# Patient Record
Sex: MALE | Race: WHITE | Employment: UNEMPLOYED | URBAN - METROPOLITAN AREA
[De-identification: names, ages, dates, MRNs, and addresses within clinical notes are randomized per-mention and may not be internally consistent; named-entity substitution may affect disease eponyms.]

---

## 2019-01-01 ENCOUNTER — MED REC SCAN ONLY (OUTPATIENT)
Dept: FAMILY MEDICINE CLINIC | Facility: CLINIC | Age: 54
End: 2019-01-01

## 2019-01-01 ENCOUNTER — HOSPITAL ENCOUNTER (INPATIENT)
Facility: HOSPITAL | Age: 54
LOS: 9 days | Discharge: HOSPICE | DRG: 682 | End: 2019-01-01
Attending: EMERGENCY MEDICINE | Admitting: HOSPITALIST
Payer: COMMERCIAL

## 2019-01-01 ENCOUNTER — APPOINTMENT (OUTPATIENT)
Dept: GENERAL RADIOLOGY | Facility: HOSPITAL | Age: 54
DRG: 871 | End: 2019-01-01
Attending: INTERNAL MEDICINE
Payer: COMMERCIAL

## 2019-01-01 ENCOUNTER — APPOINTMENT (OUTPATIENT)
Dept: GENERAL RADIOLOGY | Facility: HOSPITAL | Age: 54
DRG: 374 | End: 2019-01-01
Attending: INTERNAL MEDICINE
Payer: COMMERCIAL

## 2019-01-01 ENCOUNTER — APPOINTMENT (OUTPATIENT)
Dept: CT IMAGING | Age: 54
DRG: 871 | End: 2019-01-01
Attending: EMERGENCY MEDICINE
Payer: COMMERCIAL

## 2019-01-01 ENCOUNTER — SNF VISIT (OUTPATIENT)
Dept: INTERNAL MEDICINE CLINIC | Age: 54
End: 2019-01-01

## 2019-01-01 ENCOUNTER — NURSE ONLY (OUTPATIENT)
Dept: LAB | Age: 54
End: 2019-01-01
Attending: FAMILY MEDICINE
Payer: COMMERCIAL

## 2019-01-01 ENCOUNTER — APPOINTMENT (OUTPATIENT)
Dept: CT IMAGING | Facility: HOSPITAL | Age: 54
End: 2019-01-01
Attending: EMERGENCY MEDICINE
Payer: COMMERCIAL

## 2019-01-01 ENCOUNTER — HOSPITAL ENCOUNTER (INPATIENT)
Facility: HOSPITAL | Age: 54
LOS: 2 days | DRG: 682 | End: 2019-01-01
Attending: HOSPITALIST | Admitting: HOSPITALIST
Payer: OTHER MISCELLANEOUS

## 2019-01-01 ENCOUNTER — EXTERNAL FACILITY (OUTPATIENT)
Dept: FAMILY MEDICINE CLINIC | Facility: CLINIC | Age: 54
End: 2019-01-01

## 2019-01-01 ENCOUNTER — HOSPITAL ENCOUNTER (EMERGENCY)
Facility: HOSPITAL | Age: 54
Discharge: HOME | End: 2019-01-01
Attending: EMERGENCY MEDICINE
Payer: COMMERCIAL

## 2019-01-01 ENCOUNTER — APPOINTMENT (OUTPATIENT)
Dept: GENERAL RADIOLOGY | Facility: HOSPITAL | Age: 54
DRG: 682 | End: 2019-01-01
Attending: HOSPITALIST
Payer: COMMERCIAL

## 2019-01-01 ENCOUNTER — HOSPITAL ENCOUNTER (INPATIENT)
Facility: HOSPITAL | Age: 54
LOS: 6 days | Discharge: HOME HEALTH | DRG: 871 | End: 2019-01-01
Attending: EMERGENCY MEDICINE | Admitting: INTERNAL MEDICINE
Payer: COMMERCIAL

## 2019-01-01 ENCOUNTER — APPOINTMENT (OUTPATIENT)
Dept: ULTRASOUND IMAGING | Facility: HOSPITAL | Age: 54
End: 2019-01-01
Attending: EMERGENCY MEDICINE
Payer: COMMERCIAL

## 2019-01-01 ENCOUNTER — APPOINTMENT (OUTPATIENT)
Dept: GENERAL RADIOLOGY | Facility: HOSPITAL | Age: 54
End: 2019-01-01
Attending: EMERGENCY MEDICINE
Payer: COMMERCIAL

## 2019-01-01 ENCOUNTER — APPOINTMENT (OUTPATIENT)
Dept: GENERAL RADIOLOGY | Facility: HOSPITAL | Age: 54
DRG: 682 | End: 2019-01-01
Attending: INTERNAL MEDICINE
Payer: COMMERCIAL

## 2019-01-01 ENCOUNTER — APPOINTMENT (OUTPATIENT)
Dept: CT IMAGING | Facility: HOSPITAL | Age: 54
DRG: 871 | End: 2019-01-01
Attending: INTERNAL MEDICINE
Payer: COMMERCIAL

## 2019-01-01 ENCOUNTER — APPOINTMENT (OUTPATIENT)
Dept: GENERAL RADIOLOGY | Facility: HOSPITAL | Age: 54
DRG: 374 | End: 2019-01-01
Attending: EMERGENCY MEDICINE
Payer: COMMERCIAL

## 2019-01-01 ENCOUNTER — APPOINTMENT (OUTPATIENT)
Dept: MRI IMAGING | Facility: HOSPITAL | Age: 54
DRG: 871 | End: 2019-01-01
Attending: INTERNAL MEDICINE
Payer: COMMERCIAL

## 2019-01-01 ENCOUNTER — APPOINTMENT (OUTPATIENT)
Dept: GENERAL RADIOLOGY | Facility: HOSPITAL | Age: 54
DRG: 374 | End: 2019-01-01
Attending: HOSPITALIST
Payer: COMMERCIAL

## 2019-01-01 ENCOUNTER — INITIAL APN SNF VISIT (OUTPATIENT)
Dept: INTERNAL MEDICINE CLINIC | Age: 54
End: 2019-01-01

## 2019-01-01 ENCOUNTER — HOSPITAL ENCOUNTER (INPATIENT)
Facility: HOSPITAL | Age: 54
LOS: 15 days | Discharge: NURSING, REHAB OR LONG-TERM CARE | DRG: 374 | End: 2019-01-01
Attending: EMERGENCY MEDICINE | Admitting: INTERNAL MEDICINE
Payer: COMMERCIAL

## 2019-01-01 ENCOUNTER — APPOINTMENT (OUTPATIENT)
Dept: MRI IMAGING | Facility: HOSPITAL | Age: 54
DRG: 374 | End: 2019-01-01
Attending: INTERNAL MEDICINE
Payer: COMMERCIAL

## 2019-01-01 ENCOUNTER — APPOINTMENT (OUTPATIENT)
Dept: ULTRASOUND IMAGING | Facility: HOSPITAL | Age: 54
DRG: 374 | End: 2019-01-01
Attending: EMERGENCY MEDICINE
Payer: COMMERCIAL

## 2019-01-01 ENCOUNTER — APPOINTMENT (OUTPATIENT)
Dept: CV DIAGNOSTICS | Facility: HOSPITAL | Age: 54
DRG: 374 | End: 2019-01-01
Attending: INTERNAL MEDICINE
Payer: COMMERCIAL

## 2019-01-01 VITALS
OXYGEN SATURATION: 87 % | HEART RATE: 98 BPM | RESPIRATION RATE: 20 BRPM | SYSTOLIC BLOOD PRESSURE: 73 MMHG | TEMPERATURE: 99 F | DIASTOLIC BLOOD PRESSURE: 39 MMHG

## 2019-01-01 VITALS
RESPIRATION RATE: 20 BRPM | OXYGEN SATURATION: 94 % | HEART RATE: 105 BPM | SYSTOLIC BLOOD PRESSURE: 120 MMHG | DIASTOLIC BLOOD PRESSURE: 73 MMHG | TEMPERATURE: 98 F

## 2019-01-01 VITALS
HEIGHT: 72 IN | TEMPERATURE: 98 F | OXYGEN SATURATION: 92 % | WEIGHT: 206.19 LBS | SYSTOLIC BLOOD PRESSURE: 138 MMHG | DIASTOLIC BLOOD PRESSURE: 82 MMHG | BODY MASS INDEX: 27.93 KG/M2 | HEART RATE: 114 BPM | RESPIRATION RATE: 18 BRPM

## 2019-01-01 VITALS
WEIGHT: 172.19 LBS | SYSTOLIC BLOOD PRESSURE: 98 MMHG | DIASTOLIC BLOOD PRESSURE: 52 MMHG | RESPIRATION RATE: 17 BRPM | BODY MASS INDEX: 22.82 KG/M2 | OXYGEN SATURATION: 96 % | HEART RATE: 110 BPM | TEMPERATURE: 98 F | HEIGHT: 73 IN

## 2019-01-01 VITALS
OXYGEN SATURATION: 93 % | SYSTOLIC BLOOD PRESSURE: 137 MMHG | RESPIRATION RATE: 18 BRPM | WEIGHT: 219 LBS | HEIGHT: 73 IN | TEMPERATURE: 99 F | HEART RATE: 104 BPM | BODY MASS INDEX: 29.03 KG/M2 | DIASTOLIC BLOOD PRESSURE: 83 MMHG

## 2019-01-01 VITALS
WEIGHT: 206.13 LBS | RESPIRATION RATE: 22 BRPM | BODY MASS INDEX: 28 KG/M2 | SYSTOLIC BLOOD PRESSURE: 113 MMHG | HEART RATE: 106 BPM | TEMPERATURE: 98 F | OXYGEN SATURATION: 97 % | DIASTOLIC BLOOD PRESSURE: 63 MMHG

## 2019-01-01 VITALS
SYSTOLIC BLOOD PRESSURE: 112 MMHG | DIASTOLIC BLOOD PRESSURE: 81 MMHG | OXYGEN SATURATION: 90 % | RESPIRATION RATE: 18 BRPM | TEMPERATURE: 98 F | HEART RATE: 115 BPM

## 2019-01-01 VITALS
HEIGHT: 73 IN | SYSTOLIC BLOOD PRESSURE: 118 MMHG | WEIGHT: 213 LBS | TEMPERATURE: 99 F | BODY MASS INDEX: 28.23 KG/M2 | OXYGEN SATURATION: 93 % | RESPIRATION RATE: 18 BRPM | DIASTOLIC BLOOD PRESSURE: 79 MMHG | HEART RATE: 108 BPM

## 2019-01-01 VITALS
DIASTOLIC BLOOD PRESSURE: 71 MMHG | OXYGEN SATURATION: 90 % | SYSTOLIC BLOOD PRESSURE: 109 MMHG | RESPIRATION RATE: 22 BRPM | TEMPERATURE: 101 F | HEART RATE: 115 BPM

## 2019-01-01 DIAGNOSIS — D64.9 ANEMIA, UNSPECIFIED TYPE: ICD-10-CM

## 2019-01-01 DIAGNOSIS — C78.7 LIVER METASTASIS (HCC): ICD-10-CM

## 2019-01-01 DIAGNOSIS — E80.6 HYPERBILIRUBINEMIA: ICD-10-CM

## 2019-01-01 DIAGNOSIS — R09.02 HYPOXEMIA: ICD-10-CM

## 2019-01-01 DIAGNOSIS — R53.1 WEAKNESS: ICD-10-CM

## 2019-01-01 DIAGNOSIS — R53.81 PHYSICAL DECONDITIONING: ICD-10-CM
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
## 2019-01-01 DIAGNOSIS — R53.1 GENERALIZED WEAKNESS: ICD-10-CM

## 2019-01-01 DIAGNOSIS — E86.0 DEHYDRATION: ICD-10-CM

## 2019-01-01 DIAGNOSIS — A41.9 SEPSIS DUE TO PNEUMONIA (HCC): ICD-10-CM

## 2019-01-01 DIAGNOSIS — C16.9 STOMACH CANCER (HCC): Primary | ICD-10-CM

## 2019-01-01 DIAGNOSIS — D72.829 LEUKOCYTOSIS, UNSPECIFIED TYPE: ICD-10-CM

## 2019-01-01 DIAGNOSIS — J18.9 SEPSIS DUE TO PNEUMONIA (HCC): Primary | ICD-10-CM

## 2019-01-01 DIAGNOSIS — C16.0 MALIGNANT NEOPLASM OF CARDIA OF STOMACH (HCC): ICD-10-CM

## 2019-01-01 DIAGNOSIS — Z71.89 GOALS OF CARE, COUNSELING/DISCUSSION: ICD-10-CM

## 2019-01-01 DIAGNOSIS — C16.9 MALIGNANT NEOPLASM OF STOMACH, UNSPECIFIED LOCATION (HCC): Primary | ICD-10-CM

## 2019-01-01 DIAGNOSIS — E72.20 SERUM AMMONIA INCREASED (HCC): ICD-10-CM

## 2019-01-01 DIAGNOSIS — F41.1 GAD (GENERALIZED ANXIETY DISORDER): ICD-10-CM

## 2019-01-01 DIAGNOSIS — R09.02 HYPOXIA: ICD-10-CM

## 2019-01-01 DIAGNOSIS — J18.9 SEPSIS DUE TO PNEUMONIA (HCC): ICD-10-CM

## 2019-01-01 DIAGNOSIS — R10.13 ABDOMINAL PAIN, EPIGASTRIC: Primary | ICD-10-CM

## 2019-01-01 DIAGNOSIS — J18.9 COMMUNITY ACQUIRED PNEUMONIA OF RIGHT LUNG, UNSPECIFIED PART OF LUNG: ICD-10-CM

## 2019-01-01 DIAGNOSIS — Z51.5 PALLIATIVE CARE ENCOUNTER: ICD-10-CM

## 2019-01-01 DIAGNOSIS — R16.0 LIVER MASSES: ICD-10-CM

## 2019-01-01 DIAGNOSIS — D72.828 OTHER ELEVATED WHITE BLOOD CELL (WBC) COUNT: ICD-10-CM

## 2019-01-01 DIAGNOSIS — R00.0 SINUS TACHYCARDIA: ICD-10-CM

## 2019-01-01 DIAGNOSIS — E83.52 HYPERCALCEMIA OF MALIGNANCY: Primary | ICD-10-CM

## 2019-01-01 DIAGNOSIS — E87.0 HYPERNATREMIA: Primary | ICD-10-CM

## 2019-01-01 DIAGNOSIS — G93.40 ENCEPHALOPATHY: ICD-10-CM

## 2019-01-01 DIAGNOSIS — C16.9 MALIGNANT NEOPLASM OF STOMACH, UNSPECIFIED LOCATION (HCC): ICD-10-CM

## 2019-01-01 DIAGNOSIS — E83.52 HYPERCALCEMIA: ICD-10-CM

## 2019-01-01 DIAGNOSIS — R41.82 ALTERED MENTAL STATUS, UNSPECIFIED ALTERED MENTAL STATUS TYPE: ICD-10-CM

## 2019-01-01 DIAGNOSIS — E87.1 HYPONATREMIA: ICD-10-CM

## 2019-01-01 DIAGNOSIS — C16.2 MALIGNANT NEOPLASM OF BODY OF STOMACH (HCC): Primary | ICD-10-CM

## 2019-01-01 DIAGNOSIS — R50.81 FEVER IN OTHER DISEASES: ICD-10-CM

## 2019-01-01 DIAGNOSIS — A41.9 SEPSIS DUE TO PNEUMONIA (HCC): Primary | ICD-10-CM

## 2019-01-01 DIAGNOSIS — R73.9 HYPERGLYCEMIA: ICD-10-CM

## 2019-01-01 DIAGNOSIS — E83.52 HYPERCALCEMIA OF MALIGNANCY: ICD-10-CM

## 2019-01-01 DIAGNOSIS — K31.89 GASTRIC MASS: ICD-10-CM

## 2019-01-01 DIAGNOSIS — Z86.718 HISTORY OF BLOOD CLOTS: ICD-10-CM

## 2019-01-01 DIAGNOSIS — J18.9 COMMUNITY ACQUIRED PNEUMONIA OF RIGHT LUNG, UNSPECIFIED PART OF LUNG: Primary | ICD-10-CM

## 2019-01-01 LAB
ADENOVIRUS PCR:: NEGATIVE
ALBUMIN SERPL-MCNC: 1.9 G/DL (ref 3.4–5)
ALBUMIN SERPL-MCNC: 2.1 G/DL (ref 3.4–5)
ALBUMIN SERPL-MCNC: 2.3 G/DL (ref 3.4–5)
ALBUMIN SERPL-MCNC: 3.1 G/DL (ref 3.1–4.5)
ALBUMIN/GLOB SERPL: 0.5 {RATIO} (ref 1–2)
ALBUMIN/GLOB SERPL: 0.6 {RATIO} (ref 1–2)
ALP LIVER SERPL-CCNC: 298 U/L (ref 45–117)
ALP LIVER SERPL-CCNC: 311 U/L (ref 45–117)
ALP LIVER SERPL-CCNC: 360 U/L (ref 45–117)
ALP LIVER SERPL-CCNC: 379 U/L (ref 45–117)
ALT SERPL-CCNC: 30 U/L (ref 16–61)
ALT SERPL-CCNC: 31 U/L (ref 16–61)
ALT SERPL-CCNC: 41 U/L (ref 16–61)
ALT SERPL-CCNC: 75 U/L (ref 17–63)
ANION GAP SERPL CALC-SCNC: 10 MMOL/L (ref 0–18)
ANION GAP SERPL CALC-SCNC: 7 MMOL/L (ref 0–18)
ANION GAP SERPL CALC-SCNC: 8 MMOL/L (ref 0–18)
ANION GAP SERPL CALC-SCNC: 9 MMOL/L (ref 0–18)
ANTIBODY SCREEN: NEGATIVE
APTT PPP: 52.3 SECONDS (ref 26.1–34.6)
AST SERPL-CCNC: 42 U/L (ref 15–37)
AST SERPL-CCNC: 46 U/L (ref 15–37)
AST SERPL-CCNC: 51 U/L (ref 15–37)
AST SERPL-CCNC: 56 U/L (ref 15–41)
ATRIAL RATE: 101 BPM
B PERT DNA SPEC QL NAA+PROBE: NEGATIVE
BASOPHILS # BLD AUTO: 0.03 X10(3) UL (ref 0–0.2)
BASOPHILS # BLD AUTO: 0.05 X10(3) UL (ref 0–0.2)
BASOPHILS # BLD AUTO: 0.05 X10(3) UL (ref 0–0.2)
BASOPHILS # BLD AUTO: 0.06 X10(3) UL (ref 0–0.2)
BASOPHILS # BLD AUTO: 0.07 X10(3) UL (ref 0–0.2)
BASOPHILS # BLD AUTO: 0.08 X10(3) UL (ref 0–0.2)
BASOPHILS # BLD AUTO: 0.08 X10(3) UL (ref 0–0.2)
BASOPHILS NFR BLD AUTO: 0.2 %
BASOPHILS NFR BLD AUTO: 0.3 %
BASOPHILS NFR BLD AUTO: 0.5 %
BILIRUB SERPL-MCNC: 0.7 MG/DL (ref 0.1–2)
BILIRUB SERPL-MCNC: 2 MG/DL (ref 0.1–2)
BILIRUB UR QL STRIP.AUTO: NEGATIVE
BLOOD TYPE BARCODE: 5100
BUN BLD-MCNC: 12 MG/DL (ref 7–18)
BUN BLD-MCNC: 13 MG/DL (ref 8–20)
BUN BLD-MCNC: 15 MG/DL (ref 7–18)
BUN BLD-MCNC: 7 MG/DL (ref 7–18)
BUN/CREAT SERPL: 10.8 (ref 10–20)
BUN/CREAT SERPL: 13.7 (ref 10–20)
BUN/CREAT SERPL: 15 (ref 10–20)
BUN/CREAT SERPL: 16.9 (ref 10–20)
C PNEUM DNA SPEC QL NAA+PROBE: NEGATIVE
CALCIUM BLD-MCNC: 10.2 MG/DL (ref 8.5–10.1)
CALCIUM BLD-MCNC: 10.6 MG/DL (ref 8.5–10.1)
CALCIUM BLD-MCNC: 9.4 MG/DL (ref 8.5–10.1)
CALCIUM BLD-MCNC: 9.9 MG/DL (ref 8.3–10.3)
CHLORIDE SERPL-SCNC: 100 MMOL/L (ref 101–111)
CHLORIDE SERPL-SCNC: 102 MMOL/L (ref 98–107)
CHLORIDE SERPL-SCNC: 102 MMOL/L (ref 98–107)
CHLORIDE SERPL-SCNC: 99 MMOL/L (ref 98–107)
CLARITY UR REFRACT.AUTO: CLEAR
CLARITY UR REFRACT.AUTO: CLEAR
CO2 SERPL-SCNC: 24 MMOL/L (ref 21–32)
CO2 SERPL-SCNC: 26 MMOL/L (ref 21–32)
CO2 SERPL-SCNC: 26 MMOL/L (ref 21–32)
CO2 SERPL-SCNC: 29 MMOL/L (ref 22–32)
COLOR UR AUTO: YELLOW
COLOR UR AUTO: YELLOW
CORONAVIRUS 229E PCR:: NEGATIVE
CORONAVIRUS HKU1 PCR:: NEGATIVE
CORONAVIRUS NL63 PCR:: NEGATIVE
CORONAVIRUS OC43 PCR:: NEGATIVE
CREAT BLD-MCNC: 0.65 MG/DL (ref 0.7–1.3)
CREAT BLD-MCNC: 0.8 MG/DL (ref 0.7–1.3)
CREAT BLD-MCNC: 0.89 MG/DL (ref 0.7–1.3)
CREAT BLD-MCNC: 0.95 MG/DL (ref 0.7–1.3)
DEPRECATED RDW RBC AUTO: 43 FL (ref 35.1–46.3)
DEPRECATED RDW RBC AUTO: 44.3 FL (ref 35.1–46.3)
DEPRECATED RDW RBC AUTO: 45.2 FL (ref 35.1–46.3)
DEPRECATED RDW RBC AUTO: 45.4 FL (ref 35.1–46.3)
DEPRECATED RDW RBC AUTO: 45.5 FL (ref 35.1–46.3)
DEPRECATED RDW RBC AUTO: 46.5 FL (ref 35.1–46.3)
DEPRECATED RDW RBC AUTO: 46.7 FL (ref 35.1–46.3)
EOSINOPHIL # BLD AUTO: 0.01 X10(3) UL (ref 0–0.7)
EOSINOPHIL # BLD AUTO: 0.02 X10(3) UL (ref 0–0.7)
EOSINOPHIL # BLD AUTO: 0.03 X10(3) UL (ref 0–0.7)
EOSINOPHIL # BLD AUTO: 0.04 X10(3) UL (ref 0–0.7)
EOSINOPHIL # BLD AUTO: 0.05 X10(3) UL (ref 0–0.7)
EOSINOPHIL # BLD AUTO: 0.1 X10(3) UL (ref 0–0.7)
EOSINOPHIL # BLD AUTO: 0.21 X10(3) UL (ref 0–0.7)
EOSINOPHIL NFR BLD AUTO: 0 %
EOSINOPHIL NFR BLD AUTO: 0.1 %
EOSINOPHIL NFR BLD AUTO: 0.2 %
EOSINOPHIL NFR BLD AUTO: 0.2 %
EOSINOPHIL NFR BLD AUTO: 0.3 %
EOSINOPHIL NFR BLD AUTO: 0.5 %
EOSINOPHIL NFR BLD AUTO: 1.4 %
ERYTHROCYTE [DISTWIDTH] IN BLOOD BY AUTOMATED COUNT: 13.6 % (ref 11–15)
ERYTHROCYTE [DISTWIDTH] IN BLOOD BY AUTOMATED COUNT: 15.8 % (ref 11–15)
ERYTHROCYTE [DISTWIDTH] IN BLOOD BY AUTOMATED COUNT: 15.9 % (ref 11–15)
ERYTHROCYTE [DISTWIDTH] IN BLOOD BY AUTOMATED COUNT: 16 % (ref 11–15)
ERYTHROCYTE [DISTWIDTH] IN BLOOD BY AUTOMATED COUNT: 16.4 % (ref 11–15)
FLUAV RNA SPEC QL NAA+PROBE: NEGATIVE
FLUBV RNA SPEC QL NAA+PROBE: NEGATIVE
GLOBULIN PLAS-MCNC: 4 G/DL (ref 2.8–4.4)
GLOBULIN PLAS-MCNC: 4.4 G/DL (ref 2.8–4.4)
GLOBULIN PLAS-MCNC: 4.6 G/DL (ref 2.8–4.4)
GLOBULIN PLAS-MCNC: 5.1 G/DL (ref 2.8–4.4)
GLUCOSE BLD-MCNC: 103 MG/DL (ref 70–99)
GLUCOSE BLD-MCNC: 110 MG/DL (ref 70–99)
GLUCOSE BLD-MCNC: 115 MG/DL (ref 70–99)
GLUCOSE BLD-MCNC: 95 MG/DL (ref 70–99)
GLUCOSE UR STRIP.AUTO-MCNC: NEGATIVE MG/DL
GLUCOSE UR STRIP.AUTO-MCNC: NEGATIVE MG/DL
HCT VFR BLD AUTO: 24.1 % (ref 39–53)
HCT VFR BLD AUTO: 24.9 % (ref 39–53)
HCT VFR BLD AUTO: 26 % (ref 39–53)
HCT VFR BLD AUTO: 26.3 % (ref 39–53)
HCT VFR BLD AUTO: 26.4 % (ref 39–53)
HCT VFR BLD AUTO: 29.6 % (ref 39–53)
HCT VFR BLD AUTO: 40.7 % (ref 39–53)
HGB BLD-MCNC: 13 G/DL (ref 13–17.5)
HGB BLD-MCNC: 7.3 G/DL (ref 13–17.5)
HGB BLD-MCNC: 7.5 G/DL (ref 13–17.5)
HGB BLD-MCNC: 8.1 G/DL (ref 13–17.5)
HGB BLD-MCNC: 8.6 G/DL (ref 13–17.5)
IMM GRANULOCYTES # BLD AUTO: 0.06 X10(3) UL (ref 0–1)
IMM GRANULOCYTES # BLD AUTO: 0.19 X10(3) UL (ref 0–1)
IMM GRANULOCYTES # BLD AUTO: 0.2 X10(3) UL (ref 0–1)
IMM GRANULOCYTES # BLD AUTO: 0.21 X10(3) UL (ref 0–1)
IMM GRANULOCYTES # BLD AUTO: 0.23 X10(3) UL (ref 0–1)
IMM GRANULOCYTES # BLD AUTO: 0.24 X10(3) UL (ref 0–1)
IMM GRANULOCYTES # BLD AUTO: 0.29 X10(3) UL (ref 0–1)
IMM GRANULOCYTES NFR BLD: 0.4 %
IMM GRANULOCYTES NFR BLD: 0.9 %
IMM GRANULOCYTES NFR BLD: 0.9 %
IMM GRANULOCYTES NFR BLD: 1.1 %
IMM GRANULOCYTES NFR BLD: 1.2 %
IMM GRANULOCYTES NFR BLD: 1.2 %
IMM GRANULOCYTES NFR BLD: 1.5 %
INR BLD: 2.66 (ref 0.9–1.1)
KETONES UR STRIP.AUTO-MCNC: NEGATIVE MG/DL
KETONES UR STRIP.AUTO-MCNC: NEGATIVE MG/DL
LACTATE SERPL-SCNC: 1.7 MMOL/L (ref 0.4–2)
LEUKOCYTE ESTERASE UR QL STRIP.AUTO: NEGATIVE
LEUKOCYTE ESTERASE UR QL STRIP.AUTO: NEGATIVE
LIPASE: 146 U/L (ref 73–393)
LYMPHOCYTES # BLD AUTO: 1.18 X10(3) UL (ref 1–4)
LYMPHOCYTES # BLD AUTO: 1.19 X10(3) UL (ref 1–4)
LYMPHOCYTES # BLD AUTO: 1.44 X10(3) UL (ref 1–4)
LYMPHOCYTES # BLD AUTO: 1.57 X10(3) UL (ref 1–4)
LYMPHOCYTES # BLD AUTO: 1.63 X10(3) UL (ref 1–4)
LYMPHOCYTES # BLD AUTO: 1.63 X10(3) UL (ref 1–4)
LYMPHOCYTES # BLD AUTO: 1.93 X10(3) UL (ref 1–4)
LYMPHOCYTES NFR BLD AUTO: 13.3 %
LYMPHOCYTES NFR BLD AUTO: 5.1 %
LYMPHOCYTES NFR BLD AUTO: 6 %
LYMPHOCYTES NFR BLD AUTO: 7.1 %
LYMPHOCYTES NFR BLD AUTO: 7.7 %
LYMPHOCYTES NFR BLD AUTO: 8.2 %
LYMPHOCYTES NFR BLD AUTO: 8.6 %
M PROTEIN MFR SERPL ELPH: 5.9 G/DL (ref 6.4–8.2)
M PROTEIN MFR SERPL ELPH: 6.5 G/DL (ref 6.4–8.2)
M PROTEIN MFR SERPL ELPH: 6.9 G/DL (ref 6.4–8.2)
M PROTEIN MFR SERPL ELPH: 8.2 G/DL (ref 6.4–8.2)
MCH RBC QN AUTO: 23.1 PG (ref 26–34)
MCH RBC QN AUTO: 23.5 PG (ref 26–34)
MCH RBC QN AUTO: 23.9 PG (ref 26–34)
MCH RBC QN AUTO: 24.3 PG (ref 26–34)
MCH RBC QN AUTO: 24.5 PG (ref 26–34)
MCH RBC QN AUTO: 24.8 PG (ref 26–34)
MCH RBC QN AUTO: 27.9 PG (ref 26–34)
MCHC RBC AUTO-ENTMCNC: 29.1 G/DL (ref 31–37)
MCHC RBC AUTO-ENTMCNC: 30.1 G/DL (ref 31–37)
MCHC RBC AUTO-ENTMCNC: 30.3 G/DL (ref 31–37)
MCHC RBC AUTO-ENTMCNC: 30.7 G/DL (ref 31–37)
MCHC RBC AUTO-ENTMCNC: 30.8 G/DL (ref 31–37)
MCHC RBC AUTO-ENTMCNC: 31.2 G/DL (ref 31–37)
MCHC RBC AUTO-ENTMCNC: 31.9 G/DL (ref 31–37)
MCV RBC AUTO: 77.7 FL (ref 80–100)
MCV RBC AUTO: 79 FL (ref 80–100)
MCV RBC AUTO: 79.3 FL (ref 80–100)
MCV RBC AUTO: 79.6 FL (ref 80–100)
MCV RBC AUTO: 79.7 FL (ref 80–100)
MCV RBC AUTO: 79.8 FL (ref 80–100)
MCV RBC AUTO: 87.3 FL (ref 80–100)
METAPNEUMOVIRUS PCR:: NEGATIVE
MONOCYTES # BLD AUTO: 1.53 X10(3) UL (ref 0.1–1)
MONOCYTES # BLD AUTO: 1.62 X10(3) UL (ref 0.1–1)
MONOCYTES # BLD AUTO: 1.71 X10(3) UL (ref 0.1–1)
MONOCYTES # BLD AUTO: 1.73 X10(3) UL (ref 0.1–1)
MONOCYTES # BLD AUTO: 1.74 X10(3) UL (ref 0.1–1)
MONOCYTES # BLD AUTO: 1.92 X10(3) UL (ref 0.1–1)
MONOCYTES # BLD AUTO: 2.06 X10(3) UL (ref 0.1–1)
MONOCYTES NFR BLD AUTO: 10.5 %
MONOCYTES NFR BLD AUTO: 8.6 %
MONOCYTES NFR BLD AUTO: 8.6 %
MONOCYTES NFR BLD AUTO: 8.7 %
MONOCYTES NFR BLD AUTO: 8.8 %
MONOCYTES NFR BLD AUTO: 8.9 %
MONOCYTES NFR BLD AUTO: 9.1 %
MYCOPLASMA PNEUMONIA PCR:: NEGATIVE
NEUTROPHILS # BLD AUTO: 10.75 X10 (3) UL (ref 1.5–7.7)
NEUTROPHILS # BLD AUTO: 10.75 X10(3) UL (ref 1.5–7.7)
NEUTROPHILS # BLD AUTO: 15.29 X10 (3) UL (ref 1.5–7.7)
NEUTROPHILS # BLD AUTO: 15.29 X10(3) UL (ref 1.5–7.7)
NEUTROPHILS # BLD AUTO: 15.37 X10 (3) UL (ref 1.5–7.7)
NEUTROPHILS # BLD AUTO: 15.37 X10(3) UL (ref 1.5–7.7)
NEUTROPHILS # BLD AUTO: 16.26 X10 (3) UL (ref 1.5–7.7)
NEUTROPHILS # BLD AUTO: 16.26 X10(3) UL (ref 1.5–7.7)
NEUTROPHILS # BLD AUTO: 16.32 X10 (3) UL (ref 1.5–7.7)
NEUTROPHILS # BLD AUTO: 16.32 X10(3) UL (ref 1.5–7.7)
NEUTROPHILS # BLD AUTO: 18.34 X10 (3) UL (ref 1.5–7.7)
NEUTROPHILS # BLD AUTO: 18.34 X10(3) UL (ref 1.5–7.7)
NEUTROPHILS # BLD AUTO: 19.74 X10 (3) UL (ref 1.5–7.7)
NEUTROPHILS # BLD AUTO: 19.74 X10(3) UL (ref 1.5–7.7)
NEUTROPHILS NFR BLD AUTO: 73.9 %
NEUTROPHILS NFR BLD AUTO: 80.8 %
NEUTROPHILS NFR BLD AUTO: 81.4 %
NEUTROPHILS NFR BLD AUTO: 81.7 %
NEUTROPHILS NFR BLD AUTO: 82.9 %
NEUTROPHILS NFR BLD AUTO: 83.1 %
NEUTROPHILS NFR BLD AUTO: 84.9 %
NITRITE UR QL STRIP.AUTO: NEGATIVE
NITRITE UR QL STRIP.AUTO: NEGATIVE
OSMOLALITY SERPL CALC.SUM OF ELEC: 278 MOSM/KG (ref 275–295)
OSMOLALITY SERPL CALC.SUM OF ELEC: 280 MOSM/KG (ref 275–295)
OSMOLALITY SERPL CALC.SUM OF ELEC: 283 MOSM/KG (ref 275–295)
OSMOLALITY SERPL CALC.SUM OF ELEC: 284 MOSM/KG (ref 275–295)
P AXIS: 40 DEGREES
P-R INTERVAL: 138 MS
PARAINFLUENZA 1 PCR:: NEGATIVE
PARAINFLUENZA 2 PCR:: NEGATIVE
PARAINFLUENZA 3 PCR:: NEGATIVE
PARAINFLUENZA 4 PCR:: NEGATIVE
PH UR STRIP.AUTO: 5 [PH] (ref 4.5–8)
PH UR STRIP.AUTO: 6.5 [PH] (ref 4.5–8)
PLATELET # BLD AUTO: 252 10(3)UL (ref 150–450)
PLATELET # BLD AUTO: 311 10(3)UL (ref 150–450)
PLATELET # BLD AUTO: 321 10(3)UL (ref 150–450)
PLATELET # BLD AUTO: 352 10(3)UL (ref 150–450)
PLATELET # BLD AUTO: 354 10(3)UL (ref 150–450)
PLATELET # BLD AUTO: 383 10(3)UL (ref 150–450)
PLATELET # BLD AUTO: 384 10(3)UL (ref 150–450)
POCT INFLUENZA A: NEGATIVE
POCT INFLUENZA B: NEGATIVE
POTASSIUM SERPL-SCNC: 3.4 MMOL/L (ref 3.5–5.1)
POTASSIUM SERPL-SCNC: 3.9 MMOL/L (ref 3.5–5.1)
POTASSIUM SERPL-SCNC: 4.1 MMOL/L (ref 3.5–5.1)
POTASSIUM SERPL-SCNC: 4.2 MMOL/L (ref 3.5–5.1)
POTASSIUM SERPL-SCNC: 4.6 MMOL/L (ref 3.6–5.1)
PROT UR STRIP.AUTO-MCNC: NEGATIVE MG/DL
PROT UR STRIP.AUTO-MCNC: NEGATIVE MG/DL
PSA SERPL DL<=0.01 NG/ML-MCNC: 29.5 SECONDS (ref 12.2–14.4)
Q-T INTERVAL: 344 MS
QRS DURATION: 92 MS
QTC CALCULATION (BEZET): 446 MS
R AXIS: 14 DEGREES
RBC # BLD AUTO: 3.1 X10(6)UL (ref 4.3–5.7)
RBC # BLD AUTO: 3.14 X10(6)UL (ref 4.3–5.7)
RBC # BLD AUTO: 3.26 X10(6)UL (ref 4.3–5.7)
RBC # BLD AUTO: 3.3 X10(6)UL (ref 4.3–5.7)
RBC # BLD AUTO: 3.34 X10(6)UL (ref 4.3–5.7)
RBC # BLD AUTO: 3.72 X10(6)UL (ref 4.3–5.7)
RBC # BLD AUTO: 4.66 X10(6)UL (ref 4.3–5.7)
RBC UR QL AUTO: NEGATIVE
RBC UR QL AUTO: NEGATIVE
RH BLOOD TYPE: POSITIVE
RHINOVIRUS/ENTERO PCR:: NEGATIVE
RSV RNA SPEC QL NAA+PROBE: NEGATIVE
SODIUM SERPL-SCNC: 133 MMOL/L (ref 136–145)
SODIUM SERPL-SCNC: 136 MMOL/L (ref 136–144)
SODIUM SERPL-SCNC: 136 MMOL/L (ref 136–145)
SODIUM SERPL-SCNC: 137 MMOL/L (ref 136–145)
SP GR UR STRIP.AUTO: 1.01 (ref 1–1.03)
SP GR UR STRIP.AUTO: 1.01 (ref 1–1.03)
T AXIS: 16 DEGREES
UROBILINOGEN UR STRIP.AUTO-MCNC: 1 MG/DL
UROBILINOGEN UR STRIP.AUTO-MCNC: 4 MG/DL
VENTRICULAR RATE: 101 BPM
WBC # BLD AUTO: 14.6 X10(3) UL (ref 4–11)
WBC # BLD AUTO: 18.7 X10(3) UL (ref 4–11)
WBC # BLD AUTO: 19 X10(3) UL (ref 4–11)
WBC # BLD AUTO: 19.6 X10(3) UL (ref 4–11)
WBC # BLD AUTO: 19.9 X10(3) UL (ref 4–11)
WBC # BLD AUTO: 22.1 X10(3) UL (ref 4–11)
WBC # BLD AUTO: 23.3 X10(3) UL (ref 4–11)

## 2019-01-01 PROCEDURE — 71046 X-RAY EXAM CHEST 2 VIEWS: CPT | Performed by: EMERGENCY MEDICINE

## 2019-01-01 PROCEDURE — 84132 ASSAY OF SERUM POTASSIUM: CPT | Performed by: HOSPITALIST

## 2019-01-01 PROCEDURE — 93306 TTE W/DOPPLER COMPLETE: CPT | Performed by: INTERNAL MEDICINE

## 2019-01-01 PROCEDURE — 71045 X-RAY EXAM CHEST 1 VIEW: CPT | Performed by: INTERNAL MEDICINE

## 2019-01-01 PROCEDURE — 97530 THERAPEUTIC ACTIVITIES: CPT

## 2019-01-01 PROCEDURE — 80053 COMPREHEN METABOLIC PANEL: CPT | Performed by: INTERNAL MEDICINE

## 2019-01-01 PROCEDURE — 76700 US EXAM ABDOM COMPLETE: CPT | Performed by: EMERGENCY MEDICINE

## 2019-01-01 PROCEDURE — 85025 COMPLETE CBC W/AUTO DIFF WBC: CPT | Performed by: HOSPITALIST

## 2019-01-01 PROCEDURE — 85610 PROTHROMBIN TIME: CPT | Performed by: EMERGENCY MEDICINE

## 2019-01-01 PROCEDURE — 87040 BLOOD CULTURE FOR BACTERIA: CPT | Performed by: INTERNAL MEDICINE

## 2019-01-01 PROCEDURE — 87486 CHLMYD PNEUM DNA AMP PROBE: CPT | Performed by: INTERNAL MEDICINE

## 2019-01-01 PROCEDURE — 87081 CULTURE SCREEN ONLY: CPT | Performed by: EMERGENCY MEDICINE

## 2019-01-01 PROCEDURE — 99310 SBSQ NF CARE HIGH MDM 45: CPT | Performed by: NURSE PRACTITIONER

## 2019-01-01 PROCEDURE — 80053 COMPREHEN METABOLIC PANEL: CPT | Performed by: EMERGENCY MEDICINE

## 2019-01-01 PROCEDURE — 99232 SBSQ HOSP IP/OBS MODERATE 35: CPT | Performed by: OTHER

## 2019-01-01 PROCEDURE — 36430 TRANSFUSION BLD/BLD COMPNT: CPT

## 2019-01-01 PROCEDURE — 88342 IMHCHEM/IMCYTCHM 1ST ANTB: CPT | Performed by: INTERNAL MEDICINE

## 2019-01-01 PROCEDURE — 97116 GAIT TRAINING THERAPY: CPT

## 2019-01-01 PROCEDURE — 87502 INFLUENZA DNA AMP PROBE: CPT | Performed by: EMERGENCY MEDICINE

## 2019-01-01 PROCEDURE — 88341 IMHCHEM/IMCYTCHM EA ADD ANTB: CPT | Performed by: INTERNAL MEDICINE

## 2019-01-01 PROCEDURE — 85025 COMPLETE CBC W/AUTO DIFF WBC: CPT | Performed by: INTERNAL MEDICINE

## 2019-01-01 PROCEDURE — 85025 COMPLETE CBC W/AUTO DIFF WBC: CPT

## 2019-01-01 PROCEDURE — 99309 SBSQ NF CARE MODERATE MDM 30: CPT | Performed by: NURSE PRACTITIONER

## 2019-01-01 PROCEDURE — 83690 ASSAY OF LIPASE: CPT | Performed by: EMERGENCY MEDICINE

## 2019-01-01 PROCEDURE — 87040 BLOOD CULTURE FOR BACTERIA: CPT | Performed by: EMERGENCY MEDICINE

## 2019-01-01 PROCEDURE — 87798 DETECT AGENT NOS DNA AMP: CPT | Performed by: INTERNAL MEDICINE

## 2019-01-01 PROCEDURE — 82140 ASSAY OF AMMONIA: CPT

## 2019-01-01 PROCEDURE — 5A09457 ASSISTANCE WITH RESPIRATORY VENTILATION, 24-96 CONSECUTIVE HOURS, CONTINUOUS POSITIVE AIRWAY PRESSURE: ICD-10-PCS | Performed by: HOSPITALIST

## 2019-01-01 PROCEDURE — 99284 EMERGENCY DEPT VISIT MOD MDM: CPT

## 2019-01-01 PROCEDURE — 85025 COMPLETE CBC W/AUTO DIFF WBC: CPT | Performed by: EMERGENCY MEDICINE

## 2019-01-01 PROCEDURE — 99233 SBSQ HOSP IP/OBS HIGH 50: CPT | Performed by: CLINICAL NURSE SPECIALIST

## 2019-01-01 PROCEDURE — 80053 COMPREHEN METABOLIC PANEL: CPT

## 2019-01-01 PROCEDURE — 74181 MRI ABDOMEN W/O CONTRAST: CPT | Performed by: INTERNAL MEDICINE

## 2019-01-01 PROCEDURE — 96361 HYDRATE IV INFUSION ADD-ON: CPT

## 2019-01-01 PROCEDURE — 87581 M.PNEUMON DNA AMP PROBE: CPT | Performed by: INTERNAL MEDICINE

## 2019-01-01 PROCEDURE — 36415 COLL VENOUS BLD VENIPUNCTURE: CPT

## 2019-01-01 PROCEDURE — 99232 SBSQ HOSP IP/OBS MODERATE 35: CPT | Performed by: CLINICAL NURSE SPECIALIST

## 2019-01-01 PROCEDURE — 99285 EMERGENCY DEPT VISIT HI MDM: CPT

## 2019-01-01 PROCEDURE — 86850 RBC ANTIBODY SCREEN: CPT | Performed by: INTERNAL MEDICINE

## 2019-01-01 PROCEDURE — 96360 HYDRATION IV INFUSION INIT: CPT

## 2019-01-01 PROCEDURE — 99231 SBSQ HOSP IP/OBS SF/LOW 25: CPT | Performed by: NURSE PRACTITIONER

## 2019-01-01 PROCEDURE — 85027 COMPLETE CBC AUTOMATED: CPT

## 2019-01-01 PROCEDURE — 97162 PT EVAL MOD COMPLEX 30 MIN: CPT

## 2019-01-01 PROCEDURE — 74018 RADEX ABDOMEN 1 VIEW: CPT | Performed by: EMERGENCY MEDICINE

## 2019-01-01 PROCEDURE — 83605 ASSAY OF LACTIC ACID: CPT | Performed by: EMERGENCY MEDICINE

## 2019-01-01 PROCEDURE — A9575 INJ GADOTERATE MEGLUMI 0.1ML: HCPCS | Performed by: HOSPITALIST

## 2019-01-01 PROCEDURE — 97535 SELF CARE MNGMENT TRAINING: CPT

## 2019-01-01 PROCEDURE — 70553 MRI BRAIN STEM W/O & W/DYE: CPT | Performed by: INTERNAL MEDICINE

## 2019-01-01 PROCEDURE — 71045 X-RAY EXAM CHEST 1 VIEW: CPT | Performed by: HOSPITALIST

## 2019-01-01 PROCEDURE — 85730 THROMBOPLASTIN TIME PARTIAL: CPT | Performed by: EMERGENCY MEDICINE

## 2019-01-01 PROCEDURE — 71275 CT ANGIOGRAPHY CHEST: CPT | Performed by: EMERGENCY MEDICINE

## 2019-01-01 PROCEDURE — 99253 IP/OBS CNSLTJ NEW/EST LOW 45: CPT | Performed by: CLINICAL NURSE SPECIALIST

## 2019-01-01 PROCEDURE — 88307 TISSUE EXAM BY PATHOLOGIST: CPT | Performed by: INTERNAL MEDICINE

## 2019-01-01 PROCEDURE — 74177 CT ABD & PELVIS W/CONTRAST: CPT | Performed by: EMERGENCY MEDICINE

## 2019-01-01 PROCEDURE — 85007 BL SMEAR W/DIFF WBC COUNT: CPT | Performed by: EMERGENCY MEDICINE

## 2019-01-01 PROCEDURE — 70450 CT HEAD/BRAIN W/O DYE: CPT | Performed by: EMERGENCY MEDICINE

## 2019-01-01 PROCEDURE — 81003 URINALYSIS AUTO W/O SCOPE: CPT | Performed by: EMERGENCY MEDICINE

## 2019-01-01 PROCEDURE — 87430 STREP A AG IA: CPT | Performed by: EMERGENCY MEDICINE

## 2019-01-01 PROCEDURE — 71045 X-RAY EXAM CHEST 1 VIEW: CPT | Performed by: EMERGENCY MEDICINE

## 2019-01-01 PROCEDURE — 97165 OT EVAL LOW COMPLEX 30 MIN: CPT

## 2019-01-01 PROCEDURE — 74177 CT ABD & PELVIS W/CONTRAST: CPT | Performed by: INTERNAL MEDICINE

## 2019-01-01 PROCEDURE — 86920 COMPATIBILITY TEST SPIN: CPT

## 2019-01-01 PROCEDURE — 96374 THER/PROPH/DIAG INJ IV PUSH: CPT

## 2019-01-01 PROCEDURE — 93010 ELECTROCARDIOGRAM REPORT: CPT

## 2019-01-01 PROCEDURE — 96375 TX/PRO/DX INJ NEW DRUG ADDON: CPT

## 2019-01-01 PROCEDURE — 99252 IP/OBS CONSLTJ NEW/EST SF 35: CPT | Performed by: INTERNAL MEDICINE

## 2019-01-01 PROCEDURE — 90792 PSYCH DIAG EVAL W/MED SRVCS: CPT | Performed by: OTHER

## 2019-01-01 PROCEDURE — 87999 UNLISTED MICROBIOLOGY PX: CPT

## 2019-01-01 PROCEDURE — 96365 THER/PROPH/DIAG IV INF INIT: CPT

## 2019-01-01 PROCEDURE — 86901 BLOOD TYPING SEROLOGIC RH(D): CPT | Performed by: INTERNAL MEDICINE

## 2019-01-01 PROCEDURE — 93005 ELECTROCARDIOGRAM TRACING: CPT

## 2019-01-01 PROCEDURE — 82272 OCCULT BLD FECES 1-3 TESTS: CPT

## 2019-01-01 PROCEDURE — 88360 TUMOR IMMUNOHISTOCHEM/MANUAL: CPT | Performed by: INTERNAL MEDICINE

## 2019-01-01 PROCEDURE — 85027 COMPLETE CBC AUTOMATED: CPT | Performed by: EMERGENCY MEDICINE

## 2019-01-01 PROCEDURE — 99356 PROLONGED SERV,INPATIENT,1ST HR: CPT | Performed by: CLINICAL NURSE SPECIALIST

## 2019-01-01 PROCEDURE — 99306 1ST NF CARE HIGH MDM 50: CPT | Performed by: FAMILY MEDICINE

## 2019-01-01 PROCEDURE — 74230 X-RAY XM SWLNG FUNCJ C+: CPT | Performed by: INTERNAL MEDICINE

## 2019-01-01 PROCEDURE — 96376 TX/PRO/DX INJ SAME DRUG ADON: CPT

## 2019-01-01 PROCEDURE — 86900 BLOOD TYPING SEROLOGIC ABO: CPT | Performed by: INTERNAL MEDICINE

## 2019-01-01 PROCEDURE — 71046 X-RAY EXAM CHEST 2 VIEWS: CPT | Performed by: INTERNAL MEDICINE

## 2019-01-01 PROCEDURE — 87633 RESP VIRUS 12-25 TARGETS: CPT | Performed by: INTERNAL MEDICINE

## 2019-01-01 RX ORDER — HYDROCODONE BITARTRATE AND ACETAMINOPHEN 5; 325 MG/1; MG/1
2 TABLET ORAL EVERY 4 HOURS PRN
Status: DISCONTINUED | OUTPATIENT
Start: 2019-01-01 | End: 2019-01-01

## 2019-01-01 RX ORDER — METOCLOPRAMIDE HYDROCHLORIDE 5 MG/ML
10 INJECTION INTRAMUSCULAR; INTRAVENOUS EVERY 8 HOURS PRN
Status: DISCONTINUED | OUTPATIENT
Start: 2019-01-01 | End: 2019-01-01

## 2019-01-01 RX ORDER — HYDROCODONE BITARTRATE AND ACETAMINOPHEN 5; 325 MG/1; MG/1
1-2 TABLET ORAL EVERY 4 HOURS PRN
Status: DISCONTINUED | OUTPATIENT
Start: 2019-01-01 | End: 2019-01-01 | Stop reason: SDUPTHER

## 2019-01-01 RX ORDER — SODIUM CHLORIDE 9 MG/ML
INJECTION, SOLUTION INTRAVENOUS CONTINUOUS
Status: DISCONTINUED | OUTPATIENT
Start: 2019-01-01 | End: 2019-01-01

## 2019-01-01 RX ORDER — PAROXETINE HYDROCHLORIDE 20 MG/1
20 TABLET, FILM COATED ORAL EVERY EVENING
Status: DISCONTINUED | OUTPATIENT
Start: 2019-01-01 | End: 2019-01-01

## 2019-01-01 RX ORDER — POTASSIUM CHLORIDE 14.9 MG/ML
20 INJECTION INTRAVENOUS 2 TIMES DAILY
Status: DISCONTINUED | OUTPATIENT
Start: 2019-01-01 | End: 2019-01-01

## 2019-01-01 RX ORDER — PREDNISONE 1 MG/1
5 TABLET ORAL 2 TIMES DAILY
Qty: 30 TABLET | Refills: 0 | Status: ON HOLD | OUTPATIENT
Start: 2019-01-01 | End: 2019-01-01

## 2019-01-01 RX ORDER — LACTULOSE 20 G/30ML
20 SOLUTION ORAL 2 TIMES DAILY
Status: DISCONTINUED | OUTPATIENT
Start: 2019-01-01 | End: 2019-01-01

## 2019-01-01 RX ORDER — ONDANSETRON 2 MG/ML
4 INJECTION INTRAMUSCULAR; INTRAVENOUS EVERY 4 HOURS PRN
Status: DISCONTINUED | OUTPATIENT
Start: 2019-01-01 | End: 2019-01-01

## 2019-01-01 RX ORDER — CLONAZEPAM 0.5 MG/1
0.5 TABLET ORAL 2 TIMES DAILY
Status: DISCONTINUED | OUTPATIENT
Start: 2019-01-01 | End: 2019-01-01

## 2019-01-01 RX ORDER — ONDANSETRON 4 MG/1
4 TABLET, ORALLY DISINTEGRATING ORAL EVERY 6 HOURS PRN
Status: DISCONTINUED | OUTPATIENT
Start: 2019-01-01 | End: 2019-01-01

## 2019-01-01 RX ORDER — ACETAMINOPHEN 650 MG/1
650 SUPPOSITORY RECTAL EVERY 4 HOURS PRN
Status: DISCONTINUED | OUTPATIENT
Start: 2019-01-01 | End: 2019-01-01

## 2019-01-01 RX ORDER — MORPHINE SULFATE 20 MG/ML
5 SOLUTION ORAL EVERY 4 HOURS PRN
Status: DISCONTINUED | OUTPATIENT
Start: 2019-01-01 | End: 2019-01-01

## 2019-01-01 RX ORDER — MORPHINE SULFATE 4 MG/ML
2 INJECTION, SOLUTION INTRAMUSCULAR; INTRAVENOUS EVERY 2 HOUR PRN
Status: DISCONTINUED | OUTPATIENT
Start: 2019-01-01 | End: 2019-01-01

## 2019-01-01 RX ORDER — BISACODYL 10 MG
10 SUPPOSITORY, RECTAL RECTAL
Status: DISCONTINUED | OUTPATIENT
Start: 2019-01-01 | End: 2019-01-01

## 2019-01-01 RX ORDER — PAROXETINE 10 MG/1
10 TABLET, FILM COATED ORAL EVERY MORNING
Status: DISCONTINUED | OUTPATIENT
Start: 2019-01-01 | End: 2019-01-01

## 2019-01-01 RX ORDER — POTASSIUM CHLORIDE 29.8 MG/ML
40 INJECTION INTRAVENOUS ONCE
Status: COMPLETED | OUTPATIENT
Start: 2019-01-01 | End: 2019-01-01

## 2019-01-01 RX ORDER — PREDNISONE 1 MG/1
5 TABLET ORAL 2 TIMES DAILY
Status: DISCONTINUED | OUTPATIENT
Start: 2019-01-01 | End: 2019-01-01

## 2019-01-01 RX ORDER — AMOXICILLIN AND CLAVULANATE POTASSIUM 875; 125 MG/1; MG/1
1 TABLET, FILM COATED ORAL 2 TIMES DAILY
Qty: 10 TABLET | Refills: 0 | Status: SHIPPED | OUTPATIENT
Start: 2019-01-01 | End: 2019-01-01

## 2019-01-01 RX ORDER — DIPHENHYDRAMINE HYDROCHLORIDE 50 MG/ML
50 INJECTION INTRAMUSCULAR; INTRAVENOUS ONCE
Status: COMPLETED | OUTPATIENT
Start: 2019-01-01 | End: 2019-01-01

## 2019-01-01 RX ORDER — MAGNESIUM SULFATE HEPTAHYDRATE 40 MG/ML
2 INJECTION, SOLUTION INTRAVENOUS ONCE
Status: COMPLETED | OUTPATIENT
Start: 2019-01-01 | End: 2019-01-01

## 2019-01-01 RX ORDER — DOCUSATE SODIUM 100 MG/1
100 CAPSULE, LIQUID FILLED ORAL EVERY EVENING
Status: ON HOLD | COMMUNITY
End: 2019-01-01

## 2019-01-01 RX ORDER — CLONAZEPAM 0.5 MG/1
2 TABLET ORAL 2 TIMES DAILY PRN
Status: DISCONTINUED | OUTPATIENT
Start: 2019-01-01 | End: 2019-01-01

## 2019-01-01 RX ORDER — HYDROMORPHONE HYDROCHLORIDE 1 MG/ML
1 INJECTION, SOLUTION INTRAMUSCULAR; INTRAVENOUS; SUBCUTANEOUS ONCE
Status: COMPLETED | OUTPATIENT
Start: 2019-01-01 | End: 2019-01-01

## 2019-01-01 RX ORDER — HALOPERIDOL 5 MG/ML
1 INJECTION INTRAMUSCULAR
Status: DISCONTINUED | OUTPATIENT
Start: 2019-01-01 | End: 2019-01-01

## 2019-01-01 RX ORDER — MAGNESIUM OXIDE 400 MG (241.3 MG MAGNESIUM) TABLET
400 TABLET ONCE
Status: DISCONTINUED | OUTPATIENT
Start: 2019-01-01 | End: 2019-01-01

## 2019-01-01 RX ORDER — CALCITONIN SALMON 200 [USP'U]/ML
4 INJECTION, SOLUTION INTRAMUSCULAR; SUBCUTANEOUS ONCE
Status: COMPLETED | OUTPATIENT
Start: 2019-01-01 | End: 2019-01-01

## 2019-01-01 RX ORDER — LACTULOSE 20 G/30ML
20 SOLUTION ORAL 2 TIMES DAILY
Status: ON HOLD | COMMUNITY
End: 2019-01-01

## 2019-01-01 RX ORDER — MORPHINE SULFATE 20 MG/ML
SOLUTION ORAL EVERY 4 HOURS PRN
Status: DISCONTINUED | OUTPATIENT
Start: 2019-01-01 | End: 2019-01-01

## 2019-01-01 RX ORDER — QUETIAPINE 25 MG/1
12.5 TABLET, FILM COATED ORAL DAILY PRN
Status: DISCONTINUED | OUTPATIENT
Start: 2019-01-01 | End: 2019-01-01

## 2019-01-01 RX ORDER — QUETIAPINE 25 MG/1
25 TABLET, FILM COATED ORAL
Status: COMPLETED | OUTPATIENT
Start: 2019-01-01 | End: 2019-01-01

## 2019-01-01 RX ORDER — PANTOPRAZOLE SODIUM 40 MG/1
40 TABLET, DELAYED RELEASE ORAL
Status: DISCONTINUED | OUTPATIENT
Start: 2019-01-01 | End: 2019-01-01

## 2019-01-01 RX ORDER — QUETIAPINE 25 MG/1
12.5 TABLET, FILM COATED ORAL NIGHTLY
Qty: 15 TABLET | Refills: 0 | Status: SHIPPED | OUTPATIENT
Start: 2019-01-01 | End: 2019-01-01

## 2019-01-01 RX ORDER — SODIUM CHLORIDE 9 MG/ML
INJECTION, SOLUTION INTRAVENOUS CONTINUOUS
Status: ACTIVE | OUTPATIENT
Start: 2019-01-01 | End: 2019-01-01

## 2019-01-01 RX ORDER — SODIUM PHOSPHATE, DIBASIC AND SODIUM PHOSPHATE, MONOBASIC 7; 19 G/133ML; G/133ML
1 ENEMA RECTAL ONCE AS NEEDED
Status: DISCONTINUED | OUTPATIENT
Start: 2019-01-01 | End: 2019-01-01

## 2019-01-01 RX ORDER — ONDANSETRON 2 MG/ML
4 INJECTION INTRAMUSCULAR; INTRAVENOUS EVERY 6 HOURS PRN
Status: DISCONTINUED | OUTPATIENT
Start: 2019-01-01 | End: 2019-01-01

## 2019-01-01 RX ORDER — POLYETHYLENE GLYCOL 3350 17 G/17G
17 POWDER, FOR SOLUTION ORAL DAILY PRN
Status: ON HOLD | COMMUNITY
End: 2019-01-01

## 2019-01-01 RX ORDER — HYDROCODONE BITARTRATE AND ACETAMINOPHEN 5; 325 MG/1; MG/1
1 TABLET ORAL EVERY 4 HOURS PRN
Status: DISCONTINUED | OUTPATIENT
Start: 2019-01-01 | End: 2019-01-01

## 2019-01-01 RX ORDER — ACETAMINOPHEN 500 MG
500 TABLET ORAL EVERY 6 HOURS PRN
Status: DISCONTINUED | OUTPATIENT
Start: 2019-01-01 | End: 2019-01-01

## 2019-01-01 RX ORDER — LORAZEPAM 2 MG/ML
0.5 INJECTION INTRAMUSCULAR ONCE
Status: COMPLETED | OUTPATIENT
Start: 2019-01-01 | End: 2019-01-01

## 2019-01-01 RX ORDER — POTASSIUM CHLORIDE 20 MEQ/1
40 TABLET, EXTENDED RELEASE ORAL ONCE
Status: COMPLETED | OUTPATIENT
Start: 2019-01-01 | End: 2019-01-01

## 2019-01-01 RX ORDER — DEXTROSE MONOHYDRATE 50 MG/ML
INJECTION, SOLUTION INTRAVENOUS CONTINUOUS
Status: DISCONTINUED | OUTPATIENT
Start: 2019-01-01 | End: 2019-01-01

## 2019-01-01 RX ORDER — ACETAMINOPHEN 500 MG
500 TABLET ORAL EVERY 6 HOURS PRN
Status: ON HOLD | COMMUNITY
End: 2019-01-01

## 2019-01-01 RX ORDER — SCOLOPAMINE TRANSDERMAL SYSTEM 1 MG/1
1 PATCH, EXTENDED RELEASE TRANSDERMAL
Status: DISCONTINUED | OUTPATIENT
Start: 2019-01-01 | End: 2019-01-01

## 2019-01-01 RX ORDER — QUETIAPINE 50 MG/1
50 TABLET, FILM COATED ORAL NIGHTLY
Status: DISCONTINUED | OUTPATIENT
Start: 2019-01-01 | End: 2019-01-01

## 2019-01-01 RX ORDER — MORPHINE SULFATE 4 MG/ML
4 INJECTION, SOLUTION INTRAMUSCULAR; INTRAVENOUS ONCE
Status: COMPLETED | OUTPATIENT
Start: 2019-01-01 | End: 2019-01-01

## 2019-01-01 RX ORDER — LORAZEPAM 2 MG/ML
0.5 INJECTION INTRAMUSCULAR EVERY 4 HOURS PRN
Status: DISCONTINUED | OUTPATIENT
Start: 2019-01-01 | End: 2019-01-01

## 2019-01-01 RX ORDER — MORPHINE SULFATE 4 MG/ML
1 INJECTION, SOLUTION INTRAMUSCULAR; INTRAVENOUS
Status: DISCONTINUED | OUTPATIENT
Start: 2019-01-01 | End: 2019-01-01

## 2019-01-01 RX ORDER — MORPHINE SULFATE 4 MG/ML
4 INJECTION, SOLUTION INTRAMUSCULAR; INTRAVENOUS EVERY 2 HOUR PRN
Status: DISCONTINUED | OUTPATIENT
Start: 2019-01-01 | End: 2019-01-01

## 2019-01-01 RX ORDER — POTASSIUM CHLORIDE 1.5 G/1.77G
20 POWDER, FOR SOLUTION ORAL 2 TIMES DAILY
Status: DISCONTINUED | OUTPATIENT
Start: 2019-01-01 | End: 2019-01-01

## 2019-01-01 RX ORDER — DEXTROSE AND SODIUM CHLORIDE 5; .2 G/100ML; G/100ML
INJECTION, SOLUTION INTRAVENOUS CONTINUOUS
Status: DISCONTINUED | OUTPATIENT
Start: 2019-01-01 | End: 2019-01-01 | Stop reason: SDUPTHER

## 2019-01-01 RX ORDER — ACETAMINOPHEN 325 MG/1
650 TABLET ORAL EVERY 6 HOURS PRN
Status: DISCONTINUED | OUTPATIENT
Start: 2019-01-01 | End: 2019-01-01

## 2019-01-01 RX ORDER — CALCITONIN SALMON 200 [USP'U]/ML
4 INJECTION, SOLUTION INTRAMUSCULAR; SUBCUTANEOUS 2 TIMES DAILY
Status: COMPLETED | OUTPATIENT
Start: 2019-01-01 | End: 2019-01-01

## 2019-01-01 RX ORDER — MORPHINE SULFATE 4 MG/ML
1 INJECTION, SOLUTION INTRAMUSCULAR; INTRAVENOUS EVERY 2 HOUR PRN
Status: DISCONTINUED | OUTPATIENT
Start: 2019-01-01 | End: 2019-01-01

## 2019-01-01 RX ORDER — FUROSEMIDE 10 MG/ML
20 INJECTION INTRAMUSCULAR; INTRAVENOUS ONCE
Status: COMPLETED | OUTPATIENT
Start: 2019-01-01 | End: 2019-01-01

## 2019-01-01 RX ORDER — QUETIAPINE 25 MG/1
12.5 TABLET, FILM COATED ORAL NIGHTLY
Qty: 15 TABLET | Refills: 0 | Status: ON HOLD | OUTPATIENT
Start: 2019-01-01 | End: 2019-01-01

## 2019-01-01 RX ORDER — MAGNESIUM OXIDE 400 MG (241.3 MG MAGNESIUM) TABLET
400 TABLET ONCE
Status: COMPLETED | OUTPATIENT
Start: 2019-01-01 | End: 2019-01-01

## 2019-01-01 RX ORDER — FENTANYL 12 UG/H
1 PATCH TRANSDERMAL
Status: DISCONTINUED | OUTPATIENT
Start: 2019-01-01 | End: 2019-01-01

## 2019-01-01 RX ORDER — CLONAZEPAM 0.5 MG/1
0.5 TABLET ORAL EVERY MORNING
Qty: 12 TABLET | Refills: 0 | Status: ON HOLD | OUTPATIENT
Start: 2019-01-01 | End: 2019-01-01

## 2019-01-01 RX ORDER — HYDROCODONE BITARTRATE AND ACETAMINOPHEN 5; 325 MG/1; MG/1
1-2 TABLET ORAL EVERY 4 HOURS PRN
Qty: 20 TABLET | Refills: 0 | Status: ON HOLD | OUTPATIENT
Start: 2019-01-01 | End: 2019-01-01

## 2019-01-01 RX ORDER — GLYCOPYRROLATE 0.2 MG/ML
0.4 INJECTION, SOLUTION INTRAMUSCULAR; INTRAVENOUS
Status: DISCONTINUED | OUTPATIENT
Start: 2019-01-01 | End: 2019-01-01

## 2019-01-01 RX ORDER — DOCUSATE SODIUM 100 MG/1
100 CAPSULE, LIQUID FILLED ORAL 2 TIMES DAILY
Status: DISCONTINUED | OUTPATIENT
Start: 2019-01-01 | End: 2019-01-01

## 2019-01-01 RX ORDER — PROCHLORPERAZINE MALEATE 5 MG/1
5 TABLET ORAL EVERY 6 HOURS PRN
Status: DISCONTINUED | OUTPATIENT
Start: 2019-01-01 | End: 2019-01-01

## 2019-01-01 RX ORDER — CLONAZEPAM 0.5 MG/1
2 TABLET ORAL 2 TIMES DAILY
Status: DISCONTINUED | OUTPATIENT
Start: 2019-01-01 | End: 2019-01-01

## 2019-01-01 RX ORDER — POLYETHYLENE GLYCOL 3350 17 G/17G
17 POWDER, FOR SOLUTION ORAL DAILY PRN
Status: DISCONTINUED | OUTPATIENT
Start: 2019-01-01 | End: 2019-01-01

## 2019-01-01 RX ORDER — ATROPINE SULFATE 10 MG/ML
2 SOLUTION/ DROPS OPHTHALMIC EVERY 2 HOUR PRN
Status: DISCONTINUED | OUTPATIENT
Start: 2019-01-01 | End: 2019-01-01

## 2019-01-01 RX ORDER — SODIUM CHLORIDE 450 MG/100ML
INJECTION, SOLUTION INTRAVENOUS CONTINUOUS
Status: DISCONTINUED | OUTPATIENT
Start: 2019-01-01 | End: 2019-01-01

## 2019-01-01 RX ORDER — IBUPROFEN 600 MG/1
600 TABLET ORAL ONCE
Status: DISCONTINUED | OUTPATIENT
Start: 2019-01-01 | End: 2019-01-01

## 2019-01-01 RX ORDER — FUROSEMIDE 10 MG/ML
40 INJECTION INTRAMUSCULAR; INTRAVENOUS ONCE
Status: COMPLETED | OUTPATIENT
Start: 2019-01-01 | End: 2019-01-01

## 2019-01-01 RX ORDER — METOCLOPRAMIDE HYDROCHLORIDE 5 MG/ML
10 INJECTION INTRAMUSCULAR; INTRAVENOUS ONCE
Status: COMPLETED | OUTPATIENT
Start: 2019-01-01 | End: 2019-01-01

## 2019-01-01 RX ORDER — ACETAMINOPHEN 325 MG/1
650 TABLET ORAL EVERY 4 HOURS PRN
Status: DISCONTINUED | OUTPATIENT
Start: 2019-01-01 | End: 2019-01-01

## 2019-01-01 RX ORDER — PROCHLORPERAZINE MALEATE 5 MG/1
5 TABLET ORAL EVERY 6 HOURS PRN
Status: ON HOLD | COMMUNITY
End: 2019-01-01

## 2019-01-01 RX ORDER — QUETIAPINE 25 MG/1
25 TABLET, FILM COATED ORAL 2 TIMES DAILY PRN
Status: DISCONTINUED | OUTPATIENT
Start: 2019-01-01 | End: 2019-01-01

## 2019-01-01 RX ORDER — POTASSIUM CHLORIDE 20 MEQ/1
40 TABLET, EXTENDED RELEASE ORAL EVERY 4 HOURS
Status: COMPLETED | OUTPATIENT
Start: 2019-01-01 | End: 2019-01-01

## 2019-01-01 RX ORDER — HALOPERIDOL 5 MG/ML
2 INJECTION INTRAMUSCULAR
Status: DISCONTINUED | OUTPATIENT
Start: 2019-01-01 | End: 2019-01-01

## 2019-01-01 RX ORDER — CLONAZEPAM 0.5 MG/1
1 TABLET ORAL NIGHTLY
Status: DISCONTINUED | OUTPATIENT
Start: 2019-01-01 | End: 2019-01-01

## 2019-01-01 RX ORDER — POTASSIUM CHLORIDE 14.9 MG/ML
20 INJECTION INTRAVENOUS ONCE
Status: COMPLETED | OUTPATIENT
Start: 2019-01-01 | End: 2019-01-01

## 2019-01-01 RX ORDER — ACETAMINOPHEN 650 MG/1
SUPPOSITORY RECTAL
Status: DISPENSED
Start: 2019-01-01 | End: 2019-01-01

## 2019-01-01 RX ORDER — ACETAMINOPHEN 650 MG/1
650 SUPPOSITORY RECTAL ONCE
Status: COMPLETED | OUTPATIENT
Start: 2019-01-01 | End: 2019-01-01

## 2019-01-01 RX ORDER — DEXTROSE MONOHYDRATE AND SODIUM CHLORIDE 5; .225 G/100ML; G/100ML
INJECTION, SOLUTION INTRAVENOUS CONTINUOUS
Status: DISCONTINUED | OUTPATIENT
Start: 2019-01-01 | End: 2019-01-01 | Stop reason: SDUPTHER

## 2019-01-01 RX ORDER — MORPHINE SULFATE 20 MG/ML
SOLUTION ORAL EVERY 4 HOURS
Status: DISCONTINUED | OUTPATIENT
Start: 2019-01-01 | End: 2019-01-01

## 2019-01-01 RX ORDER — CLONAZEPAM 0.5 MG/1
1 TABLET ORAL 2 TIMES DAILY
Status: COMPLETED | OUTPATIENT
Start: 2019-01-01 | End: 2019-01-01

## 2019-01-01 RX ORDER — MORPHINE SULFATE 10 MG/5ML
SOLUTION ORAL
Qty: 100 ML | Refills: 0 | Status: ON HOLD | OUTPATIENT
Start: 2019-01-01 | End: 2019-01-01

## 2019-01-01 RX ORDER — CLONAZEPAM 0.5 MG/1
0.5 TABLET ORAL EVERY MORNING
Status: DISCONTINUED | OUTPATIENT
Start: 2019-01-01 | End: 2019-01-01

## 2019-01-01 RX ORDER — PAROXETINE 10 MG/1
10 TABLET, FILM COATED ORAL ONCE
Status: COMPLETED | OUTPATIENT
Start: 2019-01-01 | End: 2019-01-01

## 2019-01-01 RX ORDER — SODIUM CHLORIDE 9 MG/ML
INJECTION, SOLUTION INTRAVENOUS ONCE
Status: COMPLETED | OUTPATIENT
Start: 2019-01-01 | End: 2019-01-01

## 2019-01-01 RX ORDER — LORAZEPAM 2 MG/ML
1 INJECTION INTRAMUSCULAR EVERY 4 HOURS PRN
Status: DISCONTINUED | OUTPATIENT
Start: 2019-01-01 | End: 2019-01-01

## 2019-01-01 RX ORDER — LORAZEPAM 2 MG/ML
2 INJECTION INTRAMUSCULAR EVERY 4 HOURS PRN
Status: DISCONTINUED | OUTPATIENT
Start: 2019-01-01 | End: 2019-01-01

## 2019-01-01 RX ORDER — DEXTROSE MONOHYDRATE AND SODIUM CHLORIDE 5; .225 G/100ML; G/100ML
INJECTION, SOLUTION INTRAVENOUS CONTINUOUS
Status: DISCONTINUED | OUTPATIENT
Start: 2019-01-01 | End: 2019-01-01

## 2019-01-01 RX ORDER — CLONAZEPAM 1 MG/1
1 TABLET ORAL NIGHTLY
Qty: 12 TABLET | Refills: 0 | Status: ON HOLD | OUTPATIENT
Start: 2019-01-01 | End: 2019-01-01

## 2019-02-01 NOTE — ED PROVIDER NOTES
Patient Seen in: [facility] Emergency Department    History   Patient presents with:  Abdomen/Flank Pain (GI/)    Stated Complaint: Sent by Dr Elis Espinosa for abdominal pain that started 1/19. No n/v. +Fever.     HPI    68-year-old male presents to the [facility] except as noted above.     Physical Exam     ED Triage Vitals [01/31/19 1622]   /75   Pulse 105   Resp 20   Temp 98.6 °F (37 °C)   Temp src Temporal   SpO2 98 %   O2 Device None (Room air)       Current:/89   Pulse 111   Temp 98.6 °F (37 °C) (Te PLATELET.   Procedure                               Abnormality         Status                     ---------                               -----------         ------                     CBC W/ DIFFERENTIAL[[id number]]           Abnormal            Final resul initially had a chest x-ray and a KUB as they were concerned about the potential of pneumonia and he did seem to be constipated. There is evidence of possible ileus but his exam was more consistent with possibly gallstones.   He underwent an ultrasound whi

## 2019-02-12 PROBLEM — C78.7 LIVER METASTASIS (HCC): Status: ACTIVE | Noted: 2019-01-01

## 2019-02-12 PROBLEM — C16.2 MALIGNANT NEOPLASM OF BODY OF STOMACH (HCC): Status: ACTIVE | Noted: 2019-01-01

## 2019-03-06 PROBLEM — R73.9 HYPERGLYCEMIA: Status: ACTIVE | Noted: 2019-01-01

## 2019-03-06 PROBLEM — D72.829 LEUKOCYTOSIS: Status: ACTIVE | Noted: 2019-01-01

## 2019-03-06 PROBLEM — J18.9 COMMUNITY ACQUIRED PNEUMONIA OF RIGHT LUNG: Status: ACTIVE | Noted: 2019-01-01

## 2019-03-06 PROBLEM — D64.9 ANEMIA: Status: ACTIVE | Noted: 2019-01-01

## 2019-03-06 PROBLEM — E87.1 HYPONATREMIA: Status: ACTIVE | Noted: 2019-01-01

## 2019-03-06 PROBLEM — J18.9 COMMUNITY ACQUIRED PNEUMONIA OF RIGHT LUNG, UNSPECIFIED PART OF LUNG: Status: ACTIVE | Noted: 2019-01-01

## 2019-03-06 PROBLEM — E86.0 DEHYDRATION: Status: ACTIVE | Noted: 2019-01-01

## 2019-03-06 PROBLEM — C16.0 MALIGNANT NEOPLASM OF CARDIA OF STOMACH (HCC): Status: ACTIVE | Noted: 2019-01-01

## 2019-03-06 NOTE — CONSULTS
[facility]  Report of Consultation    Lalo Gutierrez Patient Status:  Inpatient    10/10/1965 MRN [id number]   [facility] 4NW-A Attending Kimo Santillan, DO   Hosp Day # 0 PCP Jean-Pierre Montano MD     REASON FOR CONSULTATION:     FFT  PNA Continuous  •  acetaminophen (TYLENOL) tab 650 mg, 650 mg, Oral, Q4H PRN **OR** HYDROcodone-acetaminophen (NORCO) 5-325 MG per tab 1 tablet, 1 tablet, Oral, Q4H PRN **OR** HYDROcodone-acetaminophen (NORCO) 5-325 MG per tab 2 tablet, 2 tablet, Oral, Q4H PRN Phosphatase 325 (H) 53 - 128 U/L    AST 50 (H) 11 - 38 U/L    ALT 32 10 - 47 U/L    GFR CKD-EPI 98.54 >=60.00 mL/min/1.73 m²   CBC W/ DIFFERENTIAL    Collection Time: 03/06/19  9:02 AM   Result Value Ref Range    WBC 21.76 (H) 4.00 - 13.00 10^3/uL    RBC 3 Phosphatase 360 (H) 45 - 117 U/L    Bilirubin, Total 2.0 0.1 - 2.0 mg/dL    Total Protein 6.9 6.4 - 8.2 g/dL    Albumin 2.3 (L) 3.4 - 5.0 g/dL    Globulin  4.6 (H) 2.8 - 4.4 g/dL    A/G Ratio 0.5 (L) 1.0 - 2.0   LACTIC ACID, PLASMA    Collection Time: 03/0 Negative    Ketones Urine Negative Negative mg/dL    Blood Urine Negative Negative    pH Urine 5.0 4.5 - 8.0    Protein Urine Negative Negative mg/dl    Urobilinogen Urine 4.0 (A) 0.2 - 2.0 mg/dL    Nitrite Urine Negative Negative    Leukocyte Esterase [place] blood chemistry     Overweight     Liver metastasis (HCC)     Malignant neoplasm of body of stomach (HCC)     Hyponatremia     Anemia     Leukocytosis     Hyperglycemia     Community acquired pneumonia of right lung     Community acquired pneumonia of righ

## 2019-03-06 NOTE — ED INITIAL ASSESSMENT (HPI)
Sent from walk in clinic - pt with metastatic Cancer - today pt was due for first chemo - pt had temp and was tachycardic - sent to ER

## 2019-03-06 NOTE — H&P
[facility] Hospitalist History and Physical      Patient presents with:  Fever (infectious)       PCP: Estefania Ryan MD      History of Present Illness: Patient is a 48year old male with PMH sig for gastric caner and FOUZIA who presents to [facility] with fever.  Pt came to PND or orthopnea. GASTROINTESTINAL:  No nausea, vomiting or diarrhea. GENITOURINARY:  No dysuria, frequency or urgency. MUSCULOSKELETAL:  No arthritis  SKIN:  No change in skin, hair or nails. NEUROLOGIC:  No paresthesias, weakness, or numbness.   PSYCH Only) (cpt=71260)    Result Date: 2/11/2019  CT CHEST(CONTRAST ONLY) (CPT=71260) CLINICAL INDICATION: Hepatomegaly, not elsewhere classified. COMPARISON STUDY: None. . TECHNIQUE:  Axial images of the chest were performed from the thoracic inlet through the interpretation. 5. Asymmetric right gynecomastia. Correlation with breast sonography recommended to exclude breast cancer.     Pet/ct Standard Body Scan (oncology) (LJS=09315)    Result Date: 2/28/2019  DATE OF SERVICE: 02.27.2019 WHOLE BODY PET/CT TUMOR IM noted at the gastric cardia, likely representing the primary neoplasm. This measures approximately 2.4 x 3.1 cm and demonstrates peripheral hypermetabolic activity with a maximum SUV of 14.4.  Multiple small hypermetabolic lymph nodes are seen at the jose Image degradation due to patient motion. Bibasilar soft tissue attenuation, predominantly adjacent to elevated right hemidiaphragm most consistent with atelectasis, consolidation involving the right lower lobe cannot be excluded, correlate clinically.   A for pulmonary embolism. 2. Increasing right lower lobe atelectasis/consolidation adjacent to elevated right hemidiaphragm, correlate clinically.   3. Development of a small amount of pelvic free fluid when compared to most recent CT of the abdomen and pelv exception (MIPS): Not applicable Anesthesia/sedation Level of anesthesia/sedation: Moderate sedation (conscious sedation) Anesthesia/sedation administered by:  Independent trained observer under attending supervision with continuous monitoring of the patien Equipment details: None Specimens removed: None Estimated blood loss (mL): Less than 10 Standardized report: [email or web address] Attestation Signer name: Su Rice I attest that I was present for the entire procedure.  I reviewed the stored images and agree with the procedure well. A port was placed immediately following this procedure as dictated under a separate report. IMPRESSION: 1. Ultrasound guided core needle biopsy of a right hepatic lobe mass.      Theresa Bush (us) (TUC=[id number])    Result Date: vs viral syndrome  -chest ct with possible RLL consolidation  -started iv zosyn  -cultures pending  -strep test negative  -flu negative, RVP pending  -ua clean    #Gastric cancer  -oncology on consult    #PE  -cont xarelto    #depression/anxiety  -cont pys

## 2019-03-06 NOTE — ED PROVIDER NOTES
Patient Seen in: [address] [facility] In [place]    History   Patient presents with:  Fever (infectious)    Stated Complaint: fever this am, scheduled for chemo today    HPI    The patient is a 74-year-old male who presents emergency room with Pulse 103   Temp 98.8 °F (37.1 °C) (Oral)   Resp 26   Ht 185.4 cm (6' 1\")   Wt 96.6 kg   SpO2 93%   BMI 28.10 kg/m²         Physical Exam    GENERAL: Well-developed, well-nourished male sitting up breathing easily in no apparent distress.     HEENT: Head i components within normal limits   PROTHROMBIN TIME (PT) - Abnormal; Notable for the following components:    PT 29.5 (*)     INR 2.66 (*)     All other components within normal limits   PTT, ACTIVATED - Abnormal; Notable for the following components:    PT Approved by: Consuelo Montero MD                [facility]   13:05  PT WITH NO OTHER COMPLAINTS AT THIS TIME. PT IMPROVED AT THIS TIME. PULSE RATE IMPROVED AT THIS TIME. ALL DETAILS DISCUSSED WITH DR Jarad Colvin AND DR Lisa Mccarthy AT THIS TIME. WILL ADMIT AT THIS TIME.   Adm J18.9 3/6/2019 Unknown    Hyperglycemia R73.9 3/6/2019 Yes    Hyponatremia E87.1 3/6/2019 Yes    Leukocytosis D72.829 3/6/2019 Yes

## 2019-03-06 NOTE — PROGRESS NOTES
Arrives to room 402 per ambulance transport Drowsy.  Pale , ill appearing, chronic use of pain med , fentynal patch adhered/ intact to left upper back, states no appetite , recent weight loss, constipated admission navigator completed , Dr Francis Andrade made denita

## 2019-03-07 NOTE — DIETARY NOTE
[address]     Admitting diagnosis:  Dehydration [E86.0]  Malignant neoplasm of cardia of stomach ([address] [C16.0]  Community acquired pneumonia of right lung, unspecified part of lung [J18.9]    Ht: 185.4 cm (6' 1\

## 2019-03-07 NOTE — PAYOR COMM NOTE
--------------  ADMISSION REVIEW     Payor: [employer]  Subscriber #:  [id number]  Authorization Number: [id number]    Admit date: 3/6/19  Admit time: 601 [address]       Admitting Physician: Bashir Meyer MD  Attending Physician:  DO Fran Pang limits   URINALYSIS WITH CULTURE REFLEX - Abnormal; Notable for the following components:    Bilirubin Urine Small (*)     Urobilinogen Urine 4.0 (*)     All other components within normal limits   PROTHROMBIN TIME (PT) - Abnormal; Notable for the followin INR is 2.66 which is therapeutic. Patient's influenza swab is negative. Patient's urinalysis is unremarkable.   Patient underwent a CAT scan of the chest abdomen pelvis secondary to patient's history of metastatic gastric cancer and previous pulmonary emb SpO2 93%   BMI 28.10 kg/m²          Assessment/Plan:     # Sepsis - likely 2/2 PNA vs viral syndrome  -chest ct with possible RLL consolidation  -started iv zosyn  -cultures pending  -strep test negative  -flu negative, RVP pending  -ua clean    #Gastric c

## 2019-03-07 NOTE — PROGRESS NOTES
[facility] Hospitalist Progress Note     PCP: Mouna Charles MD    CC:  Follow up    SUBJECTIVE:  Pt sitting up in chair, family at bedside. AxO x1-2. Slight cough- nonproductive. No chills. No dysuria, diarrhea.       OBJECTIVE:  Temp:  [97.9 °F (36.6 °C)-100 ondansetron HCl, acetaminophen **OR** HYDROcodone-acetaminophen **OR** HYDROcodone-acetaminophen, ondansetron HCl, Metoclopramide HCl, clonazePAM, HYDROcodone-acetaminophen **OR** HYDROcodone-acetaminophen       Assessment/Plan:          # Ruthy ortiz

## 2019-03-07 NOTE — CM/SW NOTE
03/07/19 1000   CM/SW Referral Data   Referral Source Social Work (self-referral)   Reason for Referral Discharge planning   Informant Patient   Patient Info   Patient's Mental Status Alert;Oriented   Patient's Home Environment House   Patient lives wit

## 2019-03-07 NOTE — CONSULTS
[facility] dosing service    Initial Pharmacokinetic Consult for Vancomycin Dosing     Nilson Husain is a 48year old male who is being treated for pneumonia. Pharmacy has been asked to dose Vancomycin by Dr. Jessica Mckeon    He has No Known Allergies.     Ot need BUN/Scr daily while on Vancomycin to assess renal function. 4.  Pharmacy will follow and monitor renal function changes, toxicity and efficacy. Pharmacy will continue to follow him. We appreciate the opportunity to assist in his care.     Tristan Huff

## 2019-03-07 NOTE — PLAN OF CARE
Risk for Infection    • Ability to function at adequate level Not Progressing          Anxiety    • Anxiety is at manageable level Progressing        PAIN - ADULT    • Verbalizes/displays adequate comfort level or patient's stated pain goal Progressing

## 2019-03-07 NOTE — PLAN OF CARE
Anxiety    • Anxiety is at manageable level Progressing          PAIN - ADULT    • Verbalizes/displays adequate comfort level or patient's stated pain goal Progressing          Respiratory    • Ability to function at adequate level Progressing    • Achieve

## 2019-03-07 NOTE — PROGRESS NOTES
[facility]  Progress Note    [address] Patient Status:  Inpatient    10/10/1965 MRN [id number]   [facility] 4NW-A Attending Willa Shepherd, DO   Hosp Day # 1 PCP Chikis Daivs MD     Subjective:    No fever but is confused this am Absolute 0.01 0.00 - 0.30 10ˆ3/µL    Basophils Absolute 0.08 0.00 - 0.10 10ˆ3/µL    nRBC Absolute 0.000 0.000 - 0.012 10ˆ3/µL    Neutrophils % 84.8 %    Lymphocytes % 5.5 %    Monocytes % 9.3 %    Eosinophils % 0.0 %    Basophils % 0.4 %    nRBC/100 WBC 0. RBC 3.72 (L) 4.30 - 5.70 x10(6)uL    HGB 8.6 (L) 13.0 - 17.5 g/dL    HCT 29.6 (L) 39.0 - 53.0 %    .0 150.0 - 450.0 10(3)uL    MCV 79.6 (L) 80.0 - 100.0 fL    MCH 23.1 (L) 26.0 - 34.0 pg    MCHC 29.1 (L) 31.0 - 37.0 g/dL    RDW 15.9 (H) 11.0 - 15.0 PCR: Negative Negative    Influenza B PCR: Negative Negative    Parainfluenza 1 PCR: Negative Negative    Parainlfuenza 2 PCR Negative Negative    Parainlfuenza 3 PCR Negative Negative    Parainlfuenza 4 PCR Negative Negative    Resp Syncytial Virus PCR Ne 311 (H) 45 - 117 U/L    Bilirubin, Total 2.0 0.1 - 2.0 mg/dL    Total Protein 6.5 6.4 - 8.2 g/dL    Albumin 2.1 (L) 3.4 - 5.0 g/dL    Globulin  4.4 2.8 - 4.4 g/dL    A/G Ratio 0.5 (L) 1.0 - 2.0         Imaging:    Cta Chest + Ct Abd (w) + Ct Pel (w) Sh(cpt ensure no brain metastases.      We will follow        Thank you for allowing me to participate in the care of your patient.  Erick Mann MD

## 2019-03-07 NOTE — PAYOR COMM NOTE
--------------  CONTINUED STAY REVIEW    Payor: [facility] PPO  Subscriber #:  [id number]  Authorization Number: [id number]    Admit date: 3/6/19  Admit time: 601 [address]    Admitting Physician: Mathieu Gonzalez MD  Attending Physician:  Courtney Christopher, suspect multifactorial- possibly dilutional and reactive       PLEASE PROVIDE INPATIENT AUTHORIZATION.  THANK YOU.

## 2019-03-08 NOTE — PAYOR COMM NOTE
--------------  CONTINUED STAY REVIEW    Payor: [employer]  Subscriber #:  [id number]  Authorization Number: [id number]    Admit date: 3/6/19  Admit time: 601 [address]    Admitting Physician: Katerin Whitehead MD  Attending Physician:  Saud Berger, is still very weak. CT C/A/P shows  RLL atelectasis, consolidation. Bcx so far neg.      PLAN:   - cont empiric zosyn  - no h/o MRSA, would hold vanco for now  - follow BCx, if remain neg line infection would be unlikely  - follow temps and wbc  - watch fo

## 2019-03-08 NOTE — PROGRESS NOTES
[facility] Hospitalist Progress Note     PCP: Renae Cullen MD    CC:  Follow up    SUBJECTIVE:  Pt sitting up in bed, family at bedside.   Wife reports pt is not confused this AM.      OBJECTIVE:  Temp:  [98.8 °F (37.1 °C)-100.1 °F (37.8 °C)] 100.1 °F (37.8 ° Oral Nightly   • rivaroxaban  20 mg Oral Daily with food     • sodium chloride 100 mL/hr at 03/07/19 2300     ondansetron HCl, acetaminophen **OR** HYDROcodone-acetaminophen **OR** HYDROcodone-acetaminophen, ondansetron HCl, Metoclopramide HCl, clonazePAM,

## 2019-03-08 NOTE — PROGRESS NOTES
[facility]  Progress Note    Ct Smith Patient Status:  Inpatient    10/10/1965 MRN [id number]   [facility] 4NW-A Attending Willa Shepherd, DO   Hosp Day # 2 PCP Chikis Davis MD     Subjective:    S/p MRI negative  Seen by ID a cerebral hemispheres. Small amount of fluid in the right mastoid. Dictated by: Willam Su MD on 3/07/2019 at 21:33     Approved by: Willam Su MD              Medications reviewed.     • clonazePAM  2 mg Oral BID   • rivaroxaban  10 mg Oral Once

## 2019-03-08 NOTE — CONSULTS
INFECTIOUS DISEASE CONSULT NOTE    Grecia Lee Patient Status:  Inpatient    10/10/1965 MRN [id number]   [facility] 4NW-A Attending La Diaz, *   Hosp Day # 2 PCP Tima Fuller he has never used smokeless tobacco. He reports that he drinks alcohol. He reports that he does not use drugs.     Allergies:  No Known Allergies    Medications:    Current Facility-Administered Medications:   •  Vancomycin HCl (VANCOCIN) 1,500 mg in sodium pain  : Negative for dysuria, hematuria, frequency or flank pain  Integument: Negative for rash, skin lesions, pruritus. Hematologic/lymphatic: Negative for easy bruising, bleeding  Musculoskeletal: Negative for myalgias, arthralgias, arthritis.   Neurol 2.0   TP  7.4  6.9  6.5       Microbiology    Reviewed in EMR,     Radiology: Ct Chest(contrast Only) (cpt=71260)    Result Date: 2/11/2019  CT CHEST(CONTRAST ONLY) (CPT=71260) CLINICAL INDICATION: Hepatomegaly, not elsewhere classified.  COMPARISON STUDY: Segmental and subsegmental acute pulmonary embolism. Discussed with Dr. Marylin Alex at time of interpretation. 5. Asymmetric right gynecomastia. Correlation with breast sonography recommended to exclude breast cancer.     Mri Brain (w+wo) (mmm=46122)    Result Da and intrahepatic bile duct, gastric cancer COMPARISON: CT chest 2/11/2019 TECHNIQUE:  PET-CT imaging was performed after IV administration of 13.186 mCi of F-18 FDG, with an uptake time of 90 minutes.   Tomographic images were obtained from the orbits throu periaortic regions. The maximum SUV is 16.7 and this corresponds to a portacaval lymph node. Physiologic uptake is noted. SKELETON: No suspicious focal areas of abnormal FDG uptake are detected in the visualized osseous structures. .    IMPRESSION: 1.  Hyper punctate nodules measure 5 and 3 mm within the right upper and middle lobe, respectively. MEDIASTINUM:  No mass or adenopathy. Few small scattered nodes present. MACARIO:  No mass or adenopathy.   CARDIAC:  No enlargement, pericardial thickening, or signific with liver, retroperitoneal, and lung metastases as detailed above, without significant change.    Dictated by: Jesenia Gruber MD on 3/06/2019 at 11:57     Approved by: Jesenia Gruber MD            [facility] (HGW=[id number])    Result Date: intra-service sedation time (minutes): 45 Access Local anesthesia was administered. The vessel was sonographically evaluated and determined to be patent.  Real time ultrasound was used to visualize needle entry into the vessel and a permanent image was stor (SMX=[id number])    Result Date: 2/13/2019  DATE OF SERVICE: 02.13.2019 IR BIOPSY,LIVER (US) (OPR=[id number]) CLINICAL INDICATION: Liver masses. This is a repeat biopsy. Recent left hepatic lobe lesion biopsy represented fatty infiltration. PROCEDURE: 1. symptoms on presentation  - other possibilities would be line infection or tumor fever, since has some necrotic LAD and multiple liver lesions per recent imaging.  LFTs increased on admission but no biliary tree dilation to suggest cholangitis    2. R sided

## 2019-03-09 NOTE — PROGRESS NOTES
[address] [facility]  TEL: [phone]  FAX: [phone]    Amberly Hoffmann Patient Status:  Inpatient    10/10/1965 MRN [id number]   [facility] 4NW-A Attending Salvatore Harada, *   Hosp Day # 3 PCP Dariusz Campo 96*  99  102   CO2  28.0  26.0  26.0   ALKPHO  325*  360*  311*   AST  50*  46*  42*   ALT  32  41  31   BILT  2.10*  2.0  2.0   TP  7.4  6.9  6.5         Microbiology    Reviewed in EMR,     Radiology: reviewed     ASSESSMENT:     1.  Acute febrile illness

## 2019-03-09 NOTE — PROGRESS NOTES
[facility]  Progress Note    Pam Montes Patient Status:  Inpatient    10/10/1965 MRN [id number]   [facility] 4NW-A Attending Pablito Knoxt, DO   Hosp Day # 3 PCP Mary Oates MD     Subjective:    Had a fever spike again 101.6 wi FOLFOX once he is discharged. We will arrange chemo as out patient      2. Pulmonary embolus  This was an incidental finding on staging chest CT scan, from underlying malignancy  On rivaroxaban      3. Febrile illness - PNA ?   ID consult appreciated   CT sh

## 2019-03-09 NOTE — PROGRESS NOTES
[facility] Hospitalist Progress Note     PCP: Jim Ojeda MD    CC:  Follow up    SUBJECTIVE:  Pt febrile this AM- repeat BC taken. Pt seen while shaving-- family at bedside- who feel his mental status has improved.   Pt anxious to get home    OBJECTIVE: chloride 100 mL/hr at 03/09/19 0541     ondansetron HCl, acetaminophen **OR** HYDROcodone-acetaminophen **OR** HYDROcodone-acetaminophen, ondansetron HCl, Metoclopramide HCl, HYDROcodone-acetaminophen **OR** HYDROcodone-acetaminophen       Assessment/Plan:

## 2019-03-09 NOTE — PLAN OF CARE
Assumed care @ 0730. Patient drowsy, easily arousable, oriented to person and place. Temp 101.6 this morning, Dr. Katie Armendariz  Notified by Haley segvoia, Tylenol given. Patient denies discomfort.

## 2019-03-09 NOTE — PLAN OF CARE
Anxiety    • Anxiety is at manageable level Progressing        PAIN - ADULT    • Verbalizes/displays adequate comfort level or patient's stated pain goal Progressing        Patient/Family Goals    • Patient/Family Long Term Goal Progressing    • Patient/Fa

## 2019-03-10 NOTE — PROGRESS NOTES
[facility] Hospitalist Progress Note     PCP: Estefania Ryan MD    CC:  Follow up    SUBJECTIVE:  Pt febrile this AM- wife at bedside. Sitting up in bed, feels good. Wife says he's clearer now and not as confused.  No cp/sob/n/v.      OBJECTIVE:  Temp:  [place] Pantoprazole Sodium  40 mg Oral QAM AC   • piperacillin-tazobactam  3.375 g Intravenous Q8H   • fentaNYL  1 patch Transdermal Q72H   • PARoxetine HCl  10 mg Oral QAM   • QUEtiapine Fumarate  50 mg Oral Nightly     • sodium chloride 100 mL/hr at 03/10/19 12

## 2019-03-10 NOTE — PLAN OF CARE
Pt is alert and oriented x 3, Forgetful. This morning pt was little confused  but as the day went on pt was more coherent and talking more appropriately. Vitals stable. Attempted to wean off O2 pt desats to 88-89% on RA.  Placed on O2 3L Nasal Cannula with

## 2019-03-10 NOTE — PLAN OF CARE
Anxiety    • Anxiety is at manageable level Progressing        Risk for Infection    • Ability to function at adequate level Progressing        SAFETY ADULT - FALL    • Free from fall injury Progressing        Received in bed resting, confused.  Bed alarm e

## 2019-03-10 NOTE — PROGRESS NOTES
[address] [facility]  TEL: [phone]  FAX: [phone]    Stephanielorena Kapadiaa Patient Status:  Inpatient    10/10/1965 MRN [id number]   [facility] 4NW-A Attending Laura Foley, *   Hosp Day # 4 PCP Dariusz Campo BILT  2.0  2.0  2.0   TP  6.9  6.5  5.9*         Microbiology    Reviewed in EMR,     Radiology: reviewed     ASSESSMENT:     1.  Acute febrile illness  - assume due to R sided pna in view of imaging findings and symptoms on presentation, but unusual for

## 2019-03-10 NOTE — PROGRESS NOTES
[facility]  Progress Note    Fady Kramer Patient Status:  Inpatient    10/10/1965 MRN [id number]   [facility] 4NW-A Attending Edwin Lama, DO   Hosp Day # 4 PCP Mouna Charles MD     Subjective:    100.4 earlier today  Feels well 17.5 g/dL    HCT 24.1 (L) 39.0 - 53.0 %    .0 150.0 - 450.0 10(3)uL    MCV 77.7 (L) 80.0 - 100.0 fL    MCH 23.5 (L) 26.0 - 34.0 pg    MCHC 30.3 (L) 31.0 - 37.0 g/dL    RDW 15.9 (H) 11.0 - 15.0 %    RDW-SD 44.3 35.1 - 46.3 fL    Neutrophil Absolute P CT showed some basal atel  Spiked again this am   Waiting for ID recommendations   This could be tumor fever   Continue supportive care       4. Confusion - likely related to febrile illness  Better    5.  Anemia - hgb 7.3  Would transfuse one unit today

## 2019-03-11 NOTE — CM/SW NOTE
RN stating the pt will need O2 at dc. Awaiting O2 walk and O2 order. WIll follow up regarding home O2.

## 2019-03-11 NOTE — HOME CARE LIAISON
Referral received from [facility]. [facility] is able to accept the patient.     Liaison has met with patient, discussed choice, and he is agreeable to home health for the following services: RN PT OT    [facility] has included the following

## 2019-03-11 NOTE — PAYOR COMM NOTE
--------------  CONTINUED STAY REVIEW    Payor: [employer]  Subscriber #:  [id number]  Authorization Number: [id number]    Admit date: 3/6/19  Admit time: [address]    Admitting Physician: Willa Jacobson MD  Attending Physician:  Kay Bazan, 0815 — 98 — — 89 % Abnormal  — — — NV   03/10/19 0800 — 105 — — 86 % Abnormal  — — — NV   03/10/19 0745 — 108 — — 91 % — — — NV   03/10/19 0730 — 117 — — 92 % — — — NV   03/10/19 0645 — 117 — — 91 % — — — NV   03/10/19 0532 100.4 °F (38 °C) 114 18 112/53 9   Basophil % 0.3 %     Immature Granulocyte % 1.2 %              HGB - would transfuse 1 unit today    Plan is to DC in am if he remain stable and ok with ID  Chemotherapy to start next week    ID -     Antibiotic(s): zosyn d#4    PLAN:    Continue empir TIME        APPROVED TO 3/9 - PLEASE PROVIDE ADDITIONAL APPROVED INPATIENT DAYS. THANK YOU.

## 2019-03-11 NOTE — PROGRESS NOTES
[address] [facility]  TEL: [phone]  FAX: [phone]    María Scott Patient Status:  Inpatient    10/10/1965 MRN [id number]   [facility] 4NW-A Attending Willam Abrams, *   Hosp Day # 5 PCP Dariusz Campo 102  102   --    CO2  26.0  26.0  24.0   --    ALKPHO  360*  311*  298*   --    AST  46*  42*  51*   --    ALT  41  31  30   --    BILT  2.0  2.0  2.0   --    TP  6.9  6.5  5.9*   --          Microbiology    Reviewed in EMR,   Hospital Encounter on 03/06/1

## 2019-03-11 NOTE — PHYSICAL THERAPY NOTE
PHYSICAL THERAPY EVALUATION - INPATIENT     Room Number: 402/402-A  Evaluation Date: 3/11/2019  Type of Evaluation: Initial  Physician Order: PT Eval and Treat    Presenting Problem: pneumonia  Reason for Therapy: Mobility Dysfunction and Discharge P is to go home today.     OBJECTIVE     Fall Risk: High fall risk    WEIGHT BEARING RESTRICTION                   PAIN ASSESSMENT  Ratin          COGNITION  · Following Commands:  follows multistep commands with increased time  · Safety Judgement:  decre multiple LOB posterior self corrected. Ambulation x160ft in hallway with shuffled gait with decreased yohana with CGA. Pt easily distracted but responds to cues to redirect for route finding. Modified Murphy Balance Test    1.  Sitting to standing and gait. The patient is below baseline and would benefit from skilled inpatient PT to address the above deficits to assist patient in returning to prior to level of function. Rec initial 24 supervision and home with HHC at [place] to improve balance.   Pt and

## 2019-03-11 NOTE — PLAN OF CARE
Anxiety    • Anxiety is at manageable level Progressing        Impaired Activities of Daily Living    • Achieve highest/safest level of independence in self care Progressing        PAIN - ADULT    • Verbalizes/displays adequate comfort level or patient's s

## 2019-03-11 NOTE — PROGRESS NOTES
[facility] Hospitalist Progress Note     PCP: Luis Thomason MD    CC:  Follow up    SUBJECTIVE:  Pt febrile this AM- wife at bedside. Sitting up in bed, feels good. Wife says he's clearer now and not as confused.  No cp/sob/n/v.      OBJECTIVE:  Temp:  Tiffani • [START ON 3/13/2019] rivaroxaban  20 mg Oral Daily with food   • Pantoprazole Sodium  40 mg Oral QAM AC   • piperacillin-tazobactam  3.375 g Intravenous Q8H   • fentaNYL  1 patch Transdermal Q72H   • PARoxetine HCl  10 mg Oral QAM   • QUEtiapine Benedict Mello bedside.     Garry Officer, MD  [facility]

## 2019-03-12 NOTE — CM/SW NOTE
Spoke with Phong Anthony at [place]- she received order form and indicated that pt/wife should call [phone] when leaving the hospital- they will coordinate delivery of home O2 set up. Spoke with RN- RT did delivery portable to pt's room.   She will ins

## 2019-03-12 NOTE — PAYOR COMM NOTE
--------------  CONTINUED STAY REVIEW    Payor: [facility] PPO  Subscriber #:  [id number]  Authorization Number: [id number]    Admit date: 3/6/19  Admit time: [address]    Admitting Physician: Selin Estrada MD  Attending Physician:  MD Tosha Diana

## 2019-03-12 NOTE — PLAN OF CARE
Anxiety    • Anxiety is at manageable level Progressing        Respiratory    • Ability to function at adequate level Progressing    • Achieves optimal ventilation and oxygenation Progressing        SAFETY ADULT - FALL    • Free from fall injury Progressin

## 2019-03-12 NOTE — CM/SW NOTE
Call re: home O2. Referral sent via [facility] to [facility] ins. Awaiting response.

## 2019-03-12 NOTE — CM/SW NOTE
Call from Azucena with [employer] [employer] [phone] that order missing \"NC\" - she faxed [facility] [employer] order form for MD.  MICHAEL sent message to MD re: signature needed- awaiting return call.     ADDENDUM:  [facility] Diamond [facility]- faxed

## 2019-03-12 NOTE — PHYSICAL THERAPY NOTE
PHYSICAL THERAPY TREATMENT NOTE - INPATIENT    Room Number: 765/725-F     Session: 1   Number of Visits to Meet Established Goals: 3    Presenting Problem: pneumonia    History related to current admission: Pt was admitted from home on 3/6/2019 with SOB, WALK                    AM-PAC '6-Clicks' INPATIENT SHORT FORM - BASIC MOBILITY  How much difficulty does the patient currently have. ..  -   Turning over in bed (including adjusting bedclothes, sheets and blankets)?: None   -   Sitting down on and standing and spouse, pt is able to remain on first floor of home upon d/c. Pt left in chair, needs met, spouse present. RN made aware.       THERAPEUTIC EXERCISES  Lower Extremity Ankle pumps     Upper Extremity n/a     Position Sitting     Repetitions      Sets

## 2019-03-12 NOTE — PLAN OF CARE
NURSING DISCHARGE NOTE    Discharged Home via Wheelchair. Accompanied by Spouse  Belongings Taken by patient/family. AVS printed and provided to pt and wife at the bedside.  Reviewed pt D/C plan of care including follow up needs and new medication inf

## 2019-03-12 NOTE — CM/SW NOTE
03/12/19 1500   Discharge disposition   Expected discharge disposition Home-Health   Name of Facillity/Home Care/Hospice Residential   [facility] provider [address]   Discharge transportation Private car   RN/PT/OT and home O2 arranged.

## 2019-03-12 NOTE — DISCHARGE SUMMARY
General Medicine Discharge Summary     Patient ID:  Anyi Vargas  48year old  10/10/1965    Admit date: 3/6/2019    Discharge date and time: 3/12/2019  5:36 PM     Attending Physician: No att. providers found     Primary Care Physician: Gary Avila MD consult  -FOLFOX once discharged     #PE-no acute issue  -cont xarelto     #depression/anxiety-stable  -cont pysch meds    Consults: IP CONSULT TO HOSPITALIST  IP CONSULT TO ONCOLOGY  IP CONSULT TO SOCIAL WORK  IP CONSULT TO RESPIRATORY CARE  NURSING CONSU (two) times daily as needed.  , Historical    PARoxetine HCl 20 MG Oral Tab  Take 10 mg by mouth every morning.  , Historical    QUEtiapine Fumarate 50 MG Oral Tab  Take 50 mg by mouth nightly.  , Historical    ADVIL 200 MG OR CAPS  1 CAPSULE EVERY 4 TO 6

## 2019-03-12 NOTE — PLAN OF CARE
Anxiety    • Anxiety is at manageable level Progressing        Impaired Activities of Daily Living    • Achieve highest/safest level of independence in self care Progressing        Impaired Functional Mobility    • Achieve highest/safest level of mobility/

## 2019-03-12 NOTE — PROGRESS NOTES
03/12/19 1200   Mobility   O2 walk?  Yes   SPO2 on Room Air at Rest 86   SPO2 Ambulation on Room Air (did not ambulate without O2 )   SPO2 Ambulation on Oxygen 91   Ambulation oxygen flow (liters per minute) 4

## 2019-03-12 NOTE — PROGRESS NOTES
[facility]  TEL: [phone]  FAX: [phone]    Izzy Pedroza Patient Status:  Inpatient    10/10/1965 MRN [id number]   [facility] 4NW-A Attending Emmanuel Shukla, *   Hosp Day # 6 PCP Dariusz Campo 3. 4*  3.9   CL  99  102  102   --    CO2  26.0  26.0  24.0   --    ALKPHO  360*  311*  298*   --    AST  46*  42*  51*   --    ALT  41  31  30   --    BILT  2.0  2.0  2.0   --    TP  6.9  6.5  5.9*   --          Microbiology    Reviewed in EMR,   MARJAN PACK JUDY - HUMACAO persistent fevers. ? Atelectasis vs tumor fevers. I suspect the later.  Again febrile today, but cx remain neg and CT yesterday w/o obvious source (other than known tumor)     2. R sided pneumonia, assume CAP, recently diagnosed with cancer but no recent ad

## 2019-03-14 NOTE — PAYOR COMM NOTE
--------------  DISCHARGE REVIEW    Payor: [facility]  Subscriber #:  [id number]  Authorization Number: [id number]    Admit date: 3/6/19  Admit time:  2208  Discharge Date: 3/12/2019  5:36 PM     Admitting Physician: Maritza Goode MD  Primary Care

## 2019-03-18 PROBLEM — J18.9 SEPSIS DUE TO PNEUMONIA (HCC): Status: ACTIVE | Noted: 2019-01-01

## 2019-03-18 PROBLEM — A41.9 SEPSIS DUE TO PNEUMONIA (HCC): Status: ACTIVE | Noted: 2019-01-01

## 2019-03-18 PROBLEM — R09.02 HYPOXEMIA: Status: ACTIVE | Noted: 2019-01-01

## 2019-03-18 PROBLEM — R41.82 ALTERED MENTAL STATUS, UNSPECIFIED ALTERED MENTAL STATUS TYPE: Status: ACTIVE | Noted: 2019-01-01

## 2019-03-18 PROBLEM — E80.6 HYPERBILIRUBINEMIA: Status: ACTIVE | Noted: 2019-01-01

## 2019-03-18 NOTE — CONSULTS
INFECTIOUS DISEASE CONSULT NOTE    Derrick Byrd Patient Status:  Inpatient    10/10/1965 MRN [id number]   [facility] 4NW-A Attending Gayle Boston MD   Hosp Day # 0 PCP Isabel Moreland amputation   • OTHER SURGICAL HISTORY      L index partial amputation   • PORT, INDWELLING, IMP       Family History   Problem Relation Age of Onset   • Hypertension Father    • Diabetes Sister       reports that  has never smoked.  he has never used smokel BILUR  Small*   KETUR  Negative   BLOODURINE  Negative   PHURINE  8.5*   PROUR  30 mg/dL*   UROBILINOGEN  >=8.0*   NITRITE  Negative   LEUUR  Negative   WBCUR  None Seen   RBCUR  None Seen   BACUR  None Seen   EPIUR  None Seen         Microbiology    Rev ABDOMEN+PELVIS(CONTRAST ONLY)(CPT=74177), 3/10/2019, 21:04. EDWARD , XR CHEST PA + LAT CHEST (CPT=71046), 3/11/2019, 16:06. EDWARD , XR CHEST PA + LAT CHEST (CPT=71046), 1/31/2019, 17:34.   INDICATIONS:  fatigue, low O2 sat, recent pneumonia, liver cancer above.  Had persistent leukocytosis last admission    PLAN:    - cover with meropenem and vanco for now  - follow Bcx, send cx from port  - GI consult for possibility of cholangitis  - check ammonia level  - follow MS  Case and plan d/w wife and with Dr Jennifer Winn

## 2019-03-18 NOTE — ED INITIAL ASSESSMENT (HPI)
Patient arrives from home by ems for fatigue, lethargy, fever. Recent diagnosis of liver cancer per ems, d/t start chemo this week. Recent pneumonia diagnosis, completed abx yesterday. Worsening over weekend.  Very lethargic upon arrival, having a hard time

## 2019-03-18 NOTE — CONSULTS
[facility]    Report of Consultation    Ct Eddies Patient Status:  Inpatient    10/10/1965 MRN [id number]   [facility] 4NW-A Attending Tereza Johnson MD   Hosp Day # 0 PCP Chikis Davis MD     Date of Admission:  3/18/2019 Oral, QPM  •  QUEtiapine Fumarate (SEROQUEL) tab 50 mg, 50 mg, Oral, Nightly  •  rivaroxaban (XARELTO) tab 20 mg, 20 mg, Oral, Daily with food  •  acetaminophen (TYLENOL EXTRA STRENGTH) tab 500 mg, 500 mg, Oral, Q6H PRN  •  Prochlorperazine Maleate (COMPAZ fatigue, chills/fever, night sweats, weight loss, loss of appetite, weight gain, sleep disturbance. Neurological: Denies frequent headaches, history of stroke, recent passing out, recent dizziness, convulsions/seizures, dementia.   Cardiovascular: Denies h (1.829 m), weight 212 lb 15.4 oz (96.6 kg), SpO2 95 %. General: Appears alert, oriented x3 and in no acute distress. HEENT: Normal. No neck vein distention. Thyroid not enlarged. No lymphadenopathy. CV: S1 and S2 normal.  No murmurs or gallops.   Lung US but no stones seen. PLAN  -  MRI/MRCP to r/o biliary obstruction from either external liver mets compression vs intra-ductal mets vs stones vs stricture.   -  If there is any biliary obstruction, the this could be a source of fever and ERCP would be n

## 2019-03-18 NOTE — ED NOTES
Patient placed on bed pan for need to defecate. Unable to have bowel movement. Linens changed and following urination and perineal care performed.

## 2019-03-18 NOTE — PLAN OF CARE
NURSING ADMISSION NOTE      Patient admitted via Cart  Oriented to room. Safety precautions initiated. Bed in low position. Call light in reach. Pt brought up from ultrasound. Pt very drowsy, VSS. Diaphoretic. Afebrile. Wife at bedside.  Admission

## 2019-03-18 NOTE — ED PROVIDER NOTES
Patient Seen in: [facility] Emergency Department    History   Patient presents with:  Fatigue (constitutional, neurologic)  Altered Mental Status (neurologic)  Pneumonia    Stated Complaint: fatigue, low O2 sat, recent pneumonia, liver cancer dx fatigue, low O2 sat, recent pneumonia, liver cancer dx  Other systems are as noted in HPI. Constitutional and vital signs reviewed. All other systems reviewed and negative except as noted above.     Physical Exam     ED Triage Vitals [03/18/19 0905] components:    Lactic Acid 2.5 (*)     All other components within normal limits   CBC W/ DIFFERENTIAL - Abnormal; Notable for the following components:    WBC 27.5 (*)     RBC 3.61 (*)     HGB 8.6 (*)     HCT 27.4 (*)     MCV 75.9 (*)     MCH 23.8 (*) pneumonia in the right lower lobe and right middle lobe. There is a 3.6 cm mass in the right infrahilar region unchanged. There is a right-sided Port-A-Cath tip in the SVC. Stable mild elevation the right hemidiaphragm.  CARDIAC:  The heart size upper li few scattered foci of FLAIR and T2 signal abnormality most consistent with chronic microvascular ischemic changes in both cerebral hemispheres. Small amount of fluid in the right mastoid.    Dictated by: Lavern Carr MD on 3/07/2019 at 21:33     Approved Some of the lesions demonstrates peripheral hypermetabolic activity, suggesting that the central portion of the lesion could be necrotic. Abnormal hypodensity is noted at the gastric cardia, likely representing the primary neoplasm.  This measures approxima patient has known lung, hepatic, and retroperitoneal metastatic deposits and gastric mass as detailed on CT scan recently performed 4 days ago.    Since the prior study there has been interval development of a moderate amount of free fluid along the liver e base and left retrocardiac region. Stable 3.6 cm nodule medial right lung base. CONCLUSION:  Worsening bibasilar airspace disease either representing atelectasis or pneumonia. Stable pulmonary nodule right lower lobe.     Dictated by: Canelo Rodriguez WALL:  No mass or axillary adenopathy. Right Port-A-Cath. AORTA:  No aneurysm or dissection. VASCULATURE:  No visible pulmonary arterial thrombus or attenuation. ABDOMEN/PELVIS: LIVER:  Enlarged fatty liver with multiple intrahepatic metastases.   Abelino Basket determination was that it was likely tumor fever but they did cover for pneumonia. He completed antibiotics 1 day ago. Sepsis workup initiated including fluid bolus. Empiric Zosyn will be started. Patient will require admission.   Will discuss with [facility]

## 2019-03-18 NOTE — CONSULTS
Pulmonary H&P/Consult       NAME: Angelica Aranda - ROOM: 718/920-L - MRN: [id number] - Age: 48year old - :  10/10/1965    Date of Admission: 3/18/2019  9:02 AM  Admission Diagnosis: Hypoxemia [R09.02]  Hyperbilirubinemia [E80.6]  Altered mental status, R thumb partial amputation   • OTHER SURGICAL HISTORY      L index partial amputation   • PORT, INDWELLING, IMP          Medications Prior to Admission:  Rivaroxaban (XARELTO) 20 MG Oral Tab Take 20 mg by mouth daily with food.  Disp:  Rfl:  3/17/2019 at [facility] Smoking status: Never Smoker      Smokeless tobacco: Never Used    Substance and Sexual Activity      Alcohol use: No        Frequency: Never      Drug use: No      Sexual activity: Not on file    Lifestyle      Physical activity:        Days per week: No congestion, sinus pain/tenderness  RESPIRATORY: see above  CARDIOVASCULAR:  denies current chest pain   GI:  denies abdominal pain  :  denies dysuria or changes in stream   MUSCULOSKELETAL:  denies back pain   NEURO:  denies headaches, no strokes or seiz symmetric all extremities   Skin:   Skin color, texture, turgor normal, no rashes or lesions   Neurologic:   CNII-XII intact.  Normal strength, sensation and reflexes       throughout         Recent Labs      03/18/19   0916   WBC  27.5*   HGB  8.6*   MCV scans  -could consider bronchoscopy to r/o atypical infection if additional w/u of fever proves unrevealing  3. Hypoxia  -due to atelectasis and hypoventilation  -AMS makes IS use and ambulation difficult- encourage as possible  4.  Dispo  -above discussed

## 2019-03-18 NOTE — H&P
General Medicine H&P     Patient presents with:  Fatigue (constitutional, neurologic)  Altered Mental Status (neurologic)  Pneumonia       PCP: Wm Velasquez MD    History of Present Illness:      48year old male with PMH sig for metastatic gastric caner, a Diabetes Sister        Review of Systems  Comprehensive ROS reviewed and negative except for what's stated above.  \    OBJECTIVE:  /71 (BP Location: Right arm)   Pulse 80   Temp 97.5 °F (36.4 °C) (Oral)   Resp 14   Ht 6' (1.829 m)   Wt 212 lb 15.4 oz hemidiaphragm. CARDIAC:  The heart size upper limits of normal without venous congestion. MEDIASTINUM:  Normal. PLEURA:  Minimal blunting of the CP angles. BONES:  Normal for age. CONCLUSION:  1.  There is right middle lobe and right lower lobe airspac Dictated by: Jozef Gomez MD on 3/18/2019 at 13:42     Approved by: Jozef Gomez MD            Mri Brain (w+wo) (OXJ=08303)    Result Date: 3/7/2019  PROCEDURE:  MRI BRAIN (W+WO) (CPT=70553)  COMPARISON:  None.   INDICATIONS:  confusion, gastric performed after IV administration of 13.186 mCi of F-18 FDG, with an uptake time of 90 minutes. Tomographic images were obtained from the orbits through the thighs, with the patient in the supine position.   The patient's blood glucose level at the time of uptake is noted. SKELETON: No suspicious focal areas of abnormal FDG uptake are detected in the visualized osseous structures. .    IMPRESSION: 1. Hypermetabolic lesion at the gastric cardia, likely representing the patient's known primary neoplasm.  2. Lawson narrowing at L5-S1 with spurring. There is splenomegaly measuring up to 15.5 cm. CONCLUSION:  1. Since the prior study there has been interval development of a moderate amount of free fluid with the largest amount seen within the pelvis.   This fluid this am, scheduled for chemo today  TECHNIQUE:  CT of the chest (with CTA imaging), abdomen, and pelvis were obtained post- injection of non-ionic intravenous contrast material. Multi-planar reformatted/3-D images were created to optimize visualization of identified. To the left of the celiac axis is a 1.3 x 0.9 cm node. Aortocaval node measures 1.5 x 2.1 versus 1.6 x 2 cm.   BOWEL/MESENTERY:  Re-identified is a 4.1 x 4.1 versus 4.2 x 4 cm mass along the medial gastric cardia near the gastroesophageal junc set up c home O2     # acute encephalopathy- suspect related to infectious process, fevers, possible fentanyl patch  -MRI brain without mets or infarct preivously  -follow     #metatsistic gastric cancer  -oncology on consult  -FOLFOX was planned     #prio

## 2019-03-18 NOTE — CONSULTS
[facility] dosing service    Initial Pharmacokinetic Consult for Vancomycin Dosing     Yolanda Mixon is a 48year old male admitted on 3/18 who is being treated for sepsis.   Pharmacy has been asked to dose Vancomycin by Dr. Nidhi Rubin    He has No Known while on Vancomycin to assess renal function. 4.  Pharmacy will follow and monitor renal function changes, toxicity and efficacy. Pharmacy will continue to follow him. We appreciate the opportunity to assist in his care.     James Campoverde

## 2019-03-19 PROBLEM — Z71.89 GOALS OF CARE, COUNSELING/DISCUSSION: Status: ACTIVE | Noted: 2019-01-01

## 2019-03-19 PROBLEM — Z51.5 PALLIATIVE CARE ENCOUNTER: Status: ACTIVE | Noted: 2019-01-01

## 2019-03-19 NOTE — PROGRESS NOTES
[facility]  TEL: [phone]  FAX: [phone]    Amberly Tahira Patient Status:  Inpatient    10/10/1965 MRN [id number]   [facility] 4NW-A Attending Candi Kilgore MD   Hosp Day # 1 PCP Deb Pham Result No Growth 1 Day N/A         Radiology:  reviewed       ASSESSMENT:     1. Fevers  - no obvious source on exam. R/o line infection. Doubt cholangitis since no obstruction. Does not seem to have pna clinically.  Recent CT A/P was neg for intra abd absc

## 2019-03-19 NOTE — HOME CARE LIAISON
Previous hospital admission patient discharged with [facility]. Unfortunately we were not able to staff for this patient. I spoke with his wife on Monday to update her and reported that he was in the ER.   She also reports that she sustained some strains due to

## 2019-03-19 NOTE — PROGRESS NOTES
[facility]    Progress Note    Mook Pastrana Patient Status:  Inpatient    10/10/1965 MRN [id number]   [facility] 4NW-A Attending Hollie Langford MD   Hosp Day # 1 PCP Stella Salas MD     Subjective:  Mook Pastrana is a(n) 48 y on board.     From a GI standpoint Bilirubin is slightly up to 3.1 from 2 last week but pt does have extensive liver mets and this may continue to climb due to liver parenchymal disease. Bile ducts dilated on US but no stones seen.     MRI/MRCP shows no bi

## 2019-03-19 NOTE — PHYSICAL THERAPY NOTE
6    PHYSICAL THERAPY EVALUATION - INPATIENT     Room Number: 414/414-A  Evaluation Date: 3/19/2019  Type of Evaluation: Initial  Physician Order: PT Eval and Treat    Presenting Problem: Hypoxemia, hyperbilirubinema, AMS and sepsis d/t PNA  Reason for T has fluctuated significantly with needs when he is lethargic and has a fever; typically indep c ADLs as well    SUBJECTIVE  N/a-pt did not verbalize anything    Patient self-stated goal is family requesting DEBORAH to get stronger    OBJECTIVE  Precautions: Be need...   -   Moving to and from a bed to a chair (including a wheelchair)?: A Lot   -   Need to walk in hospital room?: A Lot   -   Climbing 3-5 steps with a railing?: Total       AM-PAC Score:  Raw Score: 12   Approx Degree of Impairment: 68.66%   Halina Cheadle impairment in mobility. Research supports that patients with this level of impairment may benefit from NEW [place] Based on this evaluation, patient's clinical presentation is evolving and overall the evaluation complexity is considered moderate.   Shawna mckinnon

## 2019-03-19 NOTE — DIETARY NOTE
[facility]    NUTRITION INITIAL ASSESSMENT    Pt does not meet malnutrition criteria. NUTRITION DIAGNOSIS/PROBLEM:    Inadequate oral intake related to inability to meet sufficient needs as evidenced by minimal PO intake.     NUTRITION INTERVENTION: lb)  08/17/14 : 99.8 kg (220 lb)  05/05/14 : 101.6 kg (224 lb)  04/23/14 : 101.2 kg (223 lb)  04/20/14 : 100.7 kg (222 lb 0 oz)  04/08/13 : 102.5 kg (226 lb)  03/06/13 : 102.5 kg (226 lb)  02/27/13 : 102.1 kg (225 lb)  02/13/12 : 103 kg (227 lb)  11/04/11

## 2019-03-19 NOTE — CONSULTS
[facility]  Report of Consultation    Reena aCstillo Patient Status:  Inpatient    10/10/1965 MRN [id number]   [facility] 4NW-A Attending Yovani Gaona MD   Hosp Day # 1 PCP Lisa Fernandez MD     REASON FOR CONSULTATION:     Brant Sukhjinder 2 mg, 2 mg, Oral, BID  •  PARoxetine HCl (PAXIL) tab 20 mg, 20 mg, Oral, QPM  •  QUEtiapine Fumarate (SEROQUEL) tab 50 mg, 50 mg, Oral, Nightly  •  rivaroxaban (XARELTO) tab 20 mg, 20 mg, Oral, Daily with food  •  acetaminophen (TYLENOL EXTRA STRENGTH) tab oz)   SpO2 90%   BMI 28.88 kg/m²    HEENT: dry oral mucosa   Neck:  No palpable lymphadenopathy. Neck is supple. Chest: Clear to auscultation. Heart: Regular rate and rhythm. Abdomen: Soft, non tender with good bowel sounds.    Extremities: Pedal pul Range    WBC 27.5 (H) 4.0 - 11.0 x10(3) uL    RBC 3.61 (L) 4.30 - 5.70 x10(6)uL    HGB 8.6 (L) 13.0 - 17.5 g/dL    HCT 27.4 (L) 39.0 - 53.0 %    .0 150.0 - 450.0 10(3)uL    MCV 75.9 (L) 80.0 - 100.0 fL    MCH 23.8 (L) 26.0 - 34.0 pg    MCHC 31.4 31. None Seen None Seen   EKG 12-LEAD    Collection Time: 03/18/19  9:43 AM   Result Value Ref Range    Ventricular rate 124 BPM    Atrial rate 124 BPM    P-R Interval 112 ms    QRS Duration 90 ms    Q-T Interval 300 ms    QTC Calculation (Bezet) 431 ms    P A x10(3) uL    Eosinophil Absolute 0.01 0.00 - 0.70 x10(3) uL    Basophil Absolute 0.06 0.00 - 0.20 x10(3) uL    Immature Granulocyte Absolute 0.28 0.00 - 1.00 x10(3) uL    Neutrophil % 83.0 %    Lymphocyte % 7.4 %    Monocyte % 8.1 %    Eosinophil % 0.0 % abnormal blood chemistry     Overweight     Liver metastasis (HCC)     Malignant neoplasm of body of stomach (HCC)     Hyponatremia     Anemia     Leukocytosis     Hyperglycemia     Community acquired pneumonia of right lung     Community acquired pneumoni

## 2019-03-19 NOTE — PLAN OF CARE
Noted w/ poor appetite. Consumed half bottle of supplement with staff assistance. Sleeps a lot this shift,arousable & verbally responsive. Fatigued & very slow to respond. Assisted w/ all needs.

## 2019-03-19 NOTE — PROGRESS NOTES
[address] [place] [address] Patient Status:  Inpatient    10/10/1965 MRN [id number]   [facility] 4NW-A Attending Gayle Boston MD   Hosp Day # 1 PCP Nova Suárez MD     SUBJECTIVE: no new events.   Had low grade fever to 100.5 ov mg, Intravenous, Q8H  •  [COMPLETED] Vancomycin HCl (VANCOCIN) 2,000 mg in sodium chloride 0.9% 500 mL IVPB, 25 mg/kg, Intravenous, Once **FOLLOWED BY** Vancomycin HCl (VANCOCIN) 1,500 mg in sodium chloride 0.9% 500 mL IVPB, 15 mg/kg, Intravenous, Q12H However, it would not be without risk of resp failure given how weak pt is  3. Hypoxia  -due to atelectasis and hypoventilation  -AMS makes IS use and ambulation difficult- encourage as possible  4.  Met gastric cancer with diffuse met disease in liver and

## 2019-03-19 NOTE — OCCUPATIONAL THERAPY NOTE
OCCUPATIONAL THERAPY EVALUATION - INPATIENT     Room Number: 191/157-W  Evaluation Date: 3/19/2019  Type of Evaluation: Initial  Presenting Problem: sepsis due to PNA    Physician Order: IP Consult to Occupational Therapy  Reason for Therapy: ADL/IADL Dysf Dominance: Right  Drives: No  Patient Regularly Uses: None    Prior Level of Function: Per wife, pt was able to amb sans AD, however has fluctuated significantly with needs when he is lethargic and has a fever; typically indep c ADLs as well      SUBJECTIV urinal? : A Lot  -   Putting on and taking off regular upper body clothing?: A Lot  -   Taking care of personal grooming such as brushing teeth?: A Little  -   Eating meals?: A Little    AM-PAC Score:  Score: 14  Approx Degree of Impairment: 59.67%  Maureen Devine Assessment/Performance Deficits MODERATE - Comorbidities and min to mod modifications of tasks    Clinical Decision Making MODERATE - Analysis of occupational profile, detailed assessments, several treatment options    Overall Complexity MODERATE     OT Nydia Bush

## 2019-03-19 NOTE — PAYOR COMM NOTE
--------------  ADMISSION REVIEW     Payor: HANNAH [facility]  Subscriber #:  [id number]  Authorization Number: [id number]    Admit date: 3/18/19  Admit time: [address]       Admitting Physician: Julio Cesar Arellano MD  Attending Physician:  Julio Cesar Arellano MD Sodium 133 (*)     Calcium, Total 11.4 (*)     AST 80 (*)     Alkaline Phosphatase 354 (*)     Bilirubin, Total 3.1 (*)     Albumin 2.0 (*)     Globulin  4.6 (*)     A/G Ratio 0.4 (*)     All other components within normal limits   URINALYSIS WITH CULTU consult and agrees with current antibiotic regimen. Chest x-ray is concerning for worsening of airspace disease with again possibility of pneumonia. Patient covered for HCAP. Heart rate is improving with antipyretics and IV fluids.         Disposition an meds        PULMONARY CONSULT    ON 4L NC    SOMEWHAT AROUSABLE    ASSESSMENT/PLAN:     1.  Fevers  -most likely not pulmonary in nature  -relative paucity of pulmonary findings on either of his recent CT scans or his recent x-rays  -agree that malignancy m now  - follow Bcx, send cx from port  - GI consult for possibility of cholangitis  - check ammonia level  - follow MS      GI CONSULT    From a GI standpoint, I do not suspect a Gi source for the fevers.   Bilirubin is slightly up to 3.1 today from 2 last w metastases  This is advanced stage disease and so far he is not in a condition to start chemotherapy    2. Fever - previous PNA but has persistent fevers. ID consulted and GI consult reviewed  MRCP showed dilated biliary system.  We will wait for their de

## 2019-03-19 NOTE — PALLIATIVE CARE NOTE
I spoke with the patient's wife, palliative will plan to meet with her and Priti Ann tomorrow afternoon to discuss goals of care.

## 2019-03-19 NOTE — DIETARY NOTE
[address]     Admitting diagnosis:  Hypoxemia [R09.02]  Hyperbilirubinemia [E80.6]  Altered mental status, unspecified altered mental status type [R41.82]  Sepsis due to pneumonia ([facility] 75.) [J18.9, A41.9]    Ht: 182

## 2019-03-20 NOTE — PROGRESS NOTES
Pulmonary Progress Note        NAME: Emile Millard - ROOM: [address] [place] [address] - MRN: [id number] - Age: 48year old - : 10/10/1965        Last 24hrs: No events overnight, slightly more alert today, O2 needs have increased significantly- needing 7L currently    OB 03/18/19   0916   ALT  36   AST  80*   ALB  2.0*       Invalid input(s): ARTERIALPH, ARTERIALPO2, ARTERIALPCO2, ARTERIALHCO3    No results for input(s): BNP in the last 72 hours.     Invalid input(s): TROPI    INR   Date/Time Value Ref Range Status   03/06/

## 2019-03-20 NOTE — PROGRESS NOTES
[facility] Hospitalist Progress Note     PCP: Gary Avila MD    Chief Complaint: follow-up    Overnight/Interim Events:      SUBJECTIVE:  Pt laying in bed, lethargic. Dec PO intake. On 1-2L.  States he's at [facility].     OBJECTIVE:  Temp:  [97.8 °F (36.6 °C) Intravenous Q12H     • sodium chloride 100 mL/hr at 03/19/19 0304     acetaminophen, Prochlorperazine Maleate, PEG 3350, magnesium hydroxide, bisacodyl, FLEET ENEMA, ondansetron HCl, Metoclopramide HCl, HYDROcodone-acetaminophen **OR** HYDROcodone-acetamin

## 2019-03-20 NOTE — CONSULTS
[facility] dosing service    Follow-up Pharmacokinetic Consult for Vancomycin Dosing     Jame Maki is a 48year old male who is being treated for sepsis. Patient is on day 3 of Vancomycin 1.5 gm IV Q 12 hours. Goal trough is 15-20 ug/mL.     He ha weight and renal function)    2. Pharmacy will re-check Vancomycin trough levels prior to 3rd dose. Goal trough level 15-20 ug/mL. 3.  Pharmacy will need BUN/Scr daily while on Vancomycin to assess renal function.     4.  Pharmacy will follow and monit

## 2019-03-20 NOTE — PROGRESS NOTES
[address] [facility]  TEL: [phone]  FAX: [phone]    Rosas Rios Patient Status:  Inpatient    10/10/1965 MRN [id number]   [facility] 4NW-A Attending Ralph Osorio MD   Hosp Day # 2 PCP Renetta Cruz CULTURE     Status: None (Preliminary result)    Collection Time: 03/18/19  4:59 PM   Result Value Ref Range    Blood Culture Result No Growth 1 Day N/A   2. URINE CULTURE, ROUTINE     Status: None    Collection Time: 03/18/19  9:41 AM   Result Value Ref R

## 2019-03-20 NOTE — CONSULTS
[address] [facility]  [id number]  Hospital Day #2  Date of Consult: 03/20/19  Patient seen at: [facility]    Reason for Consultation:      Consult requested by Dr. Malgorzata Wong, oncologist for CHIDI BAHENA denies, O2 @ 5 L NC  Cough: no  Nausea: denies  Pain: denies    Social History:      Marital Status:   Children: one son in college  Living Situation Prior to Hospitalization: lives with wife in their home  Does Patient Live Alone: no  Is Patient Co WBC 23.7 (H) 03/19/2019    HGB 7.8 (L) 03/19/2019    HCT 25.6 (L) 03/19/2019    .0 03/19/2019       Coags:  Lab Results   Component Value Date    INR 2.66 (H) 03/06/2019    PTT 52.3 (H) 03/06/2019       Chemistry:  Lab Results   Component Value Date intermittent restlessness, feels he has to void   Skin: face with mild jaundice, extremities pale, warm and dry.     Palliative Performance Scale : 30%    Palliative Performance Scale   % Ambulation Activity Level Self-Care Intake Consciousness   100 Full N address his wishes like this.  She believes they both indicated that they would not want extraordinary measures at end of life but she would review them and bring in.     Wayne Valdivia shared that Dr. Rafita Montalvo informed her today that Fernando Burk was not well enough to receive regarding lack of treatment and prognosis was very difficult for her to hear today. She is remaining supportive of her brother and SPARKLE. Spiritual needs addressed: Unable to assess    I provided the Palliative Care Brochure and my contact information.

## 2019-03-20 NOTE — PROGRESS NOTES
[facility] Hospitalist Progress Note     PCP: Mina Rhoades MD    Chief Complaint: follow-up    Overnight/Interim Events:      SUBJECTIVE:  Pt laying in bed, lethargic but little more awake per wife today. Taking in some ensure. Wife reports MS worse last week. fentaNYL  1 patch Transdermal Q72H   • meropenem  500 mg Intravenous Q8H     • sodium chloride 50 mL/hr at 03/20/19 1116     acetaminophen, Prochlorperazine Maleate, PEG 3350, magnesium hydroxide, bisacodyl, FLEET ENEMA, ondansetron HCl, Metoclopramide HCl suspect multifactorial- has malignancy  -hgb 7.8     # hyponatremia-Monitor prn     #depression/anxiety-stable  -holding seroquel, on paxil, will decrease klonipin from 2mg to 0.5mg BID to avoid any withdrawl  -wife states was started for anxiety previousl

## 2019-03-21 NOTE — PROGRESS NOTES
Pulmonary Progress Note     Assessment / Plan:  1. Sepsis- with high grade fevers, leukocytosis and elevated lactate; Fever curve has improved and leukocytosis is stable. no clear source.  PNA is a possibility despite CXR not fully c/w this.  PCT elevated i

## 2019-03-21 NOTE — PROGRESS NOTES
[facility]  Progress Note    [facility] Status:  [facility]    10/10/1965 MRN [id number]   [facility] 4NW-A Attending Rois Scott MD   Hosp Day # 3 PCP Cynthia Rae MD     Subjective:    He is on a chair today  [facility] 111 (H) 70 - 99 mg/dL    Sodium 138 136 - 145 mmol/L    Potassium 3.6 3.5 - 5.1 mmol/L    Chloride 101 98 - 107 mmol/L    CO2 29.0 21.0 - 32.0 mmol/L    Anion Gap 8 0 - 18 mmol/L    BUN 12 7 - 18 mg/dL    Creatinine 0.57 (L) 0.70 - 1.30 mg/dL    BUN/CREA R Metastatic gastric cancer - liver metastases  This is advanced stage disease and so far he is not in a condition to start chemotherapy    2. Fever - previous PNA but has persistent fevers.    ID consulted and GI consult reviewed  MRCP showed dilated biliary

## 2019-03-21 NOTE — DIETARY NOTE
[facility]    NUTRITION FOLLOW-UP ASSESSMENT    Pt does not meet malnutrition criteria.     NUTRITION DIAGNOSIS/PROBLEM:    Inadequate oral intake related to inability to meet sufficient needs as evidenced by minimal PO intake.-ongoing    EVALUATION OF c/o nausea or vomiting. She reports minimal intake at meals, breakfast yesterday included a yogurt. Emphasized importance of PO intake and encouraged smaller frequent meals.  Discussed supplement rationale, patient agreed to have Strawberry Ensure Enlive BI supplements  3. No signs of skin breakdown  4.  Maintain lean body mass    MEDICATIONS:  Noted    LABS:  Noted    Pt is at moderate nutrition risk    FOLLOW-UP DATE: 3/26/19    Fannie Ledesma  Dietetic Intern

## 2019-03-21 NOTE — PAYOR COMM NOTE
--------------  CONTINUED STAY REVIEW    Payor: HANNAH CHARLTON  Subscriber #:  [id number]  Authorization Number: [id number]    Admit date: 3/18/19  Admit time: [address]    Admitting Physician: Emily Nguyen MD  Attending Physician:  Emily Nguyen MD  However, it would not be without risk of resp failure given how weak pt is  3.  Hypoxia  -due to atelectasis and hypoventilation  -suspect a component of fluid overload- lasix prn - AS OF NOW, NO LASIX ORDERED FOR TODAY  -AMS makes IS use and ambulation di

## 2019-03-21 NOTE — PLAN OF CARE
Remains  restless and confused and anxious. Inc or urine prior to asking to use the urinal.Continues on 7 liters per NC. Lungs clear diminished. IV fluids and antibiotics as prescribed. Needs attended to.   Has not slept all shift despite bath,hot tea,eye cov

## 2019-03-21 NOTE — PHYSICAL THERAPY NOTE
PHYSICAL THERAPY TREATMENT NOTE - INPATIENT    Room Number: 008/141-W     Session: 1   Number of Visits to Meet Established Goals: 5    Presenting Problem: Hypoxemia, hyperbilirubinema, AMS and sepsis d/t PNA    History related to current admission: Pt wa Static Sitting: Poor +  Dynamic Sitting: Poor +           Static Standing: Poor -  Dynamic Standing: Dependent    ACTIVITY TOLERANCE                         O2 WALK                    AM-PAC '6-Clicks' INP Patient End of Session: Up in chair;Call light within reach; Needs met;RN aware of session/findings; All patient questions and concerns addressed; Family present    ASSESSMENT   Pt seen this AM for bed mobility, transfers, gait training and BLE strengthen

## 2019-03-21 NOTE — PROGRESS NOTES
Eulalia Hurtado - [facility] Follow Up     Joanna Taylor  [id number]  Hospital Day #3  Date of Consult: 03/20/19  Patient seen at: [facility]    Subjective:      Patient was seen and examined with family at the be PRN  •  Metoclopramide HCl (REGLAN) injection 10 mg, 10 mg, Intravenous, Q8H PRN  •  HYDROcodone-acetaminophen (NORCO) 5-325 MG per tab 1 tablet, 1 tablet, Oral, Q4H PRN **OR** HYDROcodone-acetaminophen (NORCO) 5-325 MG per tab 2 tablet, 2 tablet, Oral, Q4 Normal or  Reduced Full   70 Reduced Some disease  Can’t perform job Full Normal or   Reduced Full   60 Reduced Significant disease  Can’t perform hobby Occasional  Assist Normal or   Reduced Full or confused   50 Mainly sit/lie Extensive Disease  Can’t do wife, was present in the room. She is pleased Gregoria Barboza has been able to eat, sat up in the chair and participated in P.T. today. She is hopeful this is the sign he is gaining strength and will be able to tolerate treatment.  She prefers to give Gregoria Barboza a few m

## 2019-03-21 NOTE — PROGRESS NOTES
[facility] Hospitalist Progress Note     PCP: Agustin Mendez MD    Chief Complaint: follow-up    Overnight/Interim Events:  None      SUBJECTIVE:  Pt seen and examined. Wife at bedside. She reports that he is more awake and alert today. Less confused.   Eating hours.      Meds:     • vancomycin  15 mg/kg Intravenous Q8H   • clonazePAM  0.5 mg Oral BID   • predniSONE  5 mg Oral BID   • PARoxetine HCl  20 mg Oral QPM   • Rivaroxaban  20 mg Oral Daily with food   • docusate sodium  100 mg Oral BID   • fentaNYL  1 p than norco so wants to continue; can also play role c encephalopathy  -now improving with decrease of klonopin     #metatsistic gastric cancer  -oncology on consult  -FOLFOX was planned, but performance status too poor to tolerate therapy at this time

## 2019-03-21 NOTE — PROGRESS NOTES
[address] [facility]  TEL: [phone]  FAX: [phone]    Brianna Neda Patient Status:  Inpatient    10/10/1965 MRN [id number]   [facility] 4NW-A Attending Elver Melo MD   Hosp Day # 3 PCP Mallorie Bowen 29.0   ALKPHO  354*   --    --    --    AST  80*   --    --    --    ALT  36   --    --    --    BILT  3.1*   --    --    --    TP  6.6   --    --    --        Microbiology    Reviewed in EMR,   Hospital Encounter on 03/18/19   1.  BLOOD CULTURE     Status: abx tomorrow  - follow Bcx  - lasix per pulm  - follow MS. Dr Burke Lopez tapering down klonopin   Case and plan d/w sister. poor prognosis. Seems too weak to undergo chemo. Dr Raffy Maldonado discussing with family.     Selena Hayes

## 2019-03-21 NOTE — PROGRESS NOTES
[facility] dosing service    Follow-up Pharmacokinetic Consult for Vancomycin Dosing     Renee Phillips is a 48year old male who is being treated for sepsis. Patient is on day 4 of Vancomycin 1.5 gm IV Q 8 hours. Goal trough is 15-20 ug/mL.     He has of 15.7 ug/mL, pharmacokinetics, actual weight and renal function)    2. Pharmacy will re-check Vancomycin trough level in 3-4 days (if renal fxn stable) if vanco continued per MD.  Goal trough level 15-20 ug/mL.     3.  Pharmacy will need BUN/Scr daily wh

## 2019-03-21 NOTE — OCCUPATIONAL THERAPY NOTE
OCCUPATIONAL THERAPY TREATMENT NOTE - INPATIENT     Room Number: 527/951-P  Session: 1   Number of Visits to Meet Established Goals: 5    Presenting Problem: sepsis due to PNA    History related to current admission: Pt was admitted from home on 3/18/2019 ACTIVITIES OF DAILY LIVING ASSESSMENT  AM-PAC ‘6-Clicks’ Inpatient Daily Activity Short Form  How much help from another person does the patient currently need…  -   Putting on and taking off regular lower body clothing?: A Lot  -   Bathing (including conservation/work simplification techniques;ADL training;IADL training;Continued evaluation; Compensatory technique education;Equipment eval/education;Patient/Family training;Patient/Family education; Endurance training;UE strengthening/ROM; Functional transf

## 2019-03-22 NOTE — PROGRESS NOTES
[facility]  Progress Note    Carmelo Ragan Patient Status:  Inpatient    10/10/1965 MRN [id number]   [facility] 4NW-A Attending Kaila Fisher MD   Hosp Day # 4 PCP Muna Tellez MD     Subjective:    Eating breakfast this am  No 4.30 - 5.70 x10(6)uL    HGB 8.4 (L) 13.0 - 17.5 g/dL    HCT 27.4 (L) 39.0 - 53.0 %    .0 150.0 - 450.0 10(3)uL    MCV 77.4 (L) 80.0 - 100.0 fL    MCH 23.7 (L) 26.0 - 34.0 pg    MCHC 30.7 (L) 31.0 - 37.0 g/dL    RDW 18.6 (H) 11.0 - 15.0 %    RDW-SD 5 Imaging:          Medications reviewed.     • clonazePAM  0.5 mg Oral BID   • vancomycin  15 mg/kg Intravenous Q8H   • predniSONE  5 mg Oral BID   • PARoxetine HCl  20 mg Oral QPM   • Rivaroxaban  20 mg Oral Daily with food   • docusate sodium  100 mg

## 2019-03-22 NOTE — PLAN OF CARE
Problem: Impaired Swallowing  Goal: Minimize aspiration risk  Interventions:  - Chopped solids and thin liquids  - Patient should be ALERT and upright for all feedings (90 degrees preferred). Do not feed pt if not alert.   - Offer food and liquids at a slow

## 2019-03-22 NOTE — PROGRESS NOTES
[facility] Hospitalist Progress Note     PCP: Marlee Osler, MD    Chief Complaint: follow-up    Overnight/Interim Events:  None      SUBJECTIVE:  Pt seen and examined. Sister at bedside. She reports that he is more awake and alert today.   Slept several hours l 36   AST  80*   ALB  2.0*       No results for input(s): PGLU in the last 168 hours. No results for input(s): TROP in the last 168 hours.       Meds:     • clonazePAM  0.5 mg Oral BID   • predniSONE  5 mg Oral BID   • PARoxetine HCl  20 mg Oral QPM   • R patch recently started (lowest dose) but wife says helping for pain much moreso than norco so wants to continue; can also play role c encephalopathy  -now improving with decrease of klonopin  -will consult psych today to assist with medical management

## 2019-03-22 NOTE — PLAN OF CARE
PAIN - ADULT    • Verbalizes/displays adequate comfort level or patient's stated pain goal Progressing        RESPIRATORY - ADULT    • Achieves optimal ventilation and oxygenation Progressing        RISK FOR INFECTION - ADULT    • Absence of fever/infectio

## 2019-03-22 NOTE — PROGRESS NOTES
[facility]  TEL: [phone]  FAX: [phone]    tC Sellersnathans Patient Status:  Inpatient    10/10/1965 MRN [id number]   [facility] 4NW-A Attending Tereza Johnson MD   Hosp Day # 4 PCP Chikis Davis 10.5*  10.7*   ALB  2.0*   --    --    --    --    NA  133*  137   --   138  136   K  4.2  4.1   --   3.6  3.8  3.8   CL  99  103   --   101  102   CO2  25.0  29.0   --   29.0  28.0   ALKPHO  354*   --    --    --    --    AST  80*   --    --    --    -- Marked leukocytosis- assume reactive to above. Had persistent leukocytosis last admission as well     PLAN:     - d/c abx since all cx have been neg, no bacterial infection has been identified.   - follow Bcx  - lasix per pulm  - up to chair as able, cont t

## 2019-03-22 NOTE — SLP NOTE
ADULT SWALLOWING EVALUATION    ASSESSMENT    ASSESSMENT/OVERALL IMPRESSION:  Bedside swallow evaluation completed. Per chart, pt admitted with fever, fatigue, and poor appetite; pt found to have pneumonia as well as encephalopathy.  Per RN, pt's family repo Hypoxemia    Hyperbilirubinemia    Goals of care, counseling/discussion    Palliative care encounter      Past Medical History  Past Medical History:   Diagnosis Date   • Anxiety state, unspecified    • Cancer [facility])    • Diverticulosis    • Diverticulosis evaluated clinically.  Videofluoroscopic Swallow Study is required to rule-out silent aspiration.)    Esophageal Phase of Swallow: No complaints consistent with possible esophageal involvement  Comments:               GOALS  Goal #1 The patient will tolerat

## 2019-03-22 NOTE — PROGRESS NOTES
Pulmonary Progress Note     Assessment / Plan:  1. Sepsis- with high grade fevers, leukocytosis and elevated lactate; Fever curve has improved and leukocytosis is stable. no clear source.  PNA is a possibility despite CXR not fully c/w this.  PCT elevated i questions appropriately    Medications:  Reviewed in EMR    Lab Data:  Reviewed in EMR    Imaging:  Chest imaging reviewed

## 2019-03-22 NOTE — PROGRESS NOTES
PSYCH CONSULT    Date of Admission: 3/18/19  Date of Consult: 3/22/19  Reason for Consultation: Altered mental status    Impression:  Primary Psychiatric Diagnosis:  Generalized anxiety disorder    Acute delirium, likely from recent sepsis and hypoxia and

## 2019-03-22 NOTE — CONSULTS
[facility]  Report of Psychiatric Consultation    Jahaira Blackwood Patient Status:  Inpatient    10/10/1965 MRN [id number]   [facility] 4NW-A Attending Padmini Ferrer, DO   Hosp Day # 4 PCP Sánchez Dennis MD     Date of Admission: 3/18/ had 4-5 hrs this afternoon fully alert. He has appeared anxious and depressed and fatigue and lacking motivation per wife. He is drowsy and does not want to talk at this time.      Past Psychiatric History: Generalized anxiety disorder, treated with Paxil a Prochlorperazine Maleate (COMPAZINE) tab 5 mg, 5 mg, Oral, Q6H PRN  •  0.9%  NaCl infusion, , Intravenous, Continuous  •  docusate sodium (COLACE) cap 100 mg, 100 mg, Oral, BID  •  PEG 3350 (MIRALAX) powder packet 17 g, 17 g, Oral, Daily PRN  •  magnesium 03/22/2019    BUN 10 03/22/2019     03/22/2019    K 3.8 03/22/2019    K 3.8 03/22/2019     03/22/2019    CO2 28.0 03/22/2019     03/22/2019    CA 10.7 03/22/2019

## 2019-03-23 NOTE — PROGRESS NOTES
Hematology/Oncology  Progress Note        NAME: Wilton Kruse - ROOM: 418/794- - MRN: [id number] - Age: 48year old - : 10/10/1965    Subjective  No acute events overnight. Afebrile  Sitting in chair   Wife at bedside     Medications:  • clonazePAM  0. < >  138  136  136   K  4.2   < >  3.6  3.8  3.8  3.8   CL  99   < >  101  102  100   CO2  25.0   < >  29.0  28.0  30.0   ALKPHO  354*   --    --    --    --    AST  80*   --    --    --    --    ALT  36   --    --    --    --    BILT  3.1*   --    --    -

## 2019-03-23 NOTE — PLAN OF CARE
GENITOURINARY - ADULT    • Absence of urinary retention Progressing        Impaired Functional Mobility    • Achieve highest/safest level of mobility/gait Progressing        RESPIRATORY - ADULT    • Achieves optimal ventilation and oxygenation Progressing

## 2019-03-23 NOTE — PROGRESS NOTES
[facility] Hospitalist Progress Note     PCP: Stella Salas MD    Chief Complaint: follow-up    Overnight/Interim Events:  None      SUBJECTIVE:  No pain. More awake. On 3L. Wife reports reacting fasrer.      OBJECTIVE:  Temp:  [98.2 °F (36.8 °C)-99.4 °F (37.4 °C) 03/18/19   0916  03/22/19   0808   ALT  36  34   AST  80*  81*   ALB  2.0*  1.7*       No results for input(s): PGLU in the last 168 hours. No results for input(s): TROP in the last 168 hours.       Meds:     • clonazePAM  0.5 mg Oral BID   • predniSONE days  -follow  -can consider neuro input if needed but doubt cva, last admit MRI ok, no focal weakneess  -fentanyl patch recently started (lowest dose) but wife says helping for pain much moreso than norco so wants to continue; can also play role terell faulkner

## 2019-03-23 NOTE — PROGRESS NOTES
Pulmonary Progress Note        NAME: Jahaira Blackwood - ROOM: 139/944-J - MRN: [id number] - Age: 48year old - : 10/10/1965    Past Medical History:   Diagnosis Date   • Anxiety state, unspecified    • Cancer [facility])    • Diverticulosis    • Diverticulosis o 77.2*   MCH  23.8*  23.7*  23.7*  23.6*   MCHC  30.5*  31.2  30.7*  30.6*   RDW  17.6*  18.2*  18.6*  19.0*   NEPRELIM  19.64*   --   22.41*  21.93*   WBC  23.7*  24.0*  26.4*  25.8*   PLT  395.0  375.0  316.0  289.0         Recent Labs   Lab  03/21/19   0

## 2019-03-23 NOTE — PROGRESS NOTES
[address] [facility]  TEL: [phone]  FAX: [phone]    Joanna Taylor Patient Status:  Inpatient    10/10/1965 MRN [id number]   [facility] 4NW-A Attending Jordy Barboza MD   Hosp Day # 5 PONCHO O[address]eda 99   < >  101  102  100   CO2  25.0   < >  29.0  28.0  30.0   ALKPHO  354*   --    --   320*   --    AST  80*   --    --   81*   --    ALT  36   --    --   34   --    BILT  3.1*   --    --   2.0   --    TP  6.6   --    --   5.9*   --     < > = values in th PT. IS  D/w wife  A little better today but still may be too weak to undergo chemo. Heme onc discussing plan with family.     Ervin Cueva

## 2019-03-24 NOTE — PROGRESS NOTES
Hematology/Oncology  Progress Note        NAME: María Scott - ROOM: 330/764-I - MRN: [id number] - Age: 48year old - : 10/10/1965    Subjective  No acute events overnight.  He is still confused  Wife at bedside     Medications:  • clonazePAM  0.5 mg Or 1.7*   --    --   1.9*   NA  133*   < >  136  136   --   133*   K  4.2   < >  3.8  3.8  3.8  4.0  3.9   CL  99   < >  102  100   --   96*   CO2  25.0   < >  28.0  30.0   --   28.0   ALKPHO  354*   --   320*   --    --   391*   AST  80*   --   81*   --    -

## 2019-03-24 NOTE — PLAN OF CARE
RESPIRATORY - ADULT    • Achieves optimal ventilation and oxygenation Progressing        RISK FOR INFECTION - ADULT    • Absence of fever/infection during anticipated neutropenic period Progressing        SAFETY ADULT - FALL    • Free from fall injury Prog

## 2019-03-24 NOTE — PLAN OF CARE
Delirium    • Minimize duration of delirium Progressing        GENITOURINARY - ADULT    • Absence of urinary retention Progressing        Impaired Activities of Daily Living    • Achieve highest/safest level of independence in self care Progressing

## 2019-03-24 NOTE — PROGRESS NOTES
[facility] Hospitalist Progress Note     PCP: Sheeba Carter MD    Chief Complaint: follow-up    Overnight/Interim Events:  None      SUBJECTIVE:  Pt more awake, eating food. Tachy to 140s earlier, temp ~99, lactate 2.1. OBJECTIVE:  Temp:  [98.6 °F (37 °C)-99. 2.0   --   1.9   --    --    --   1.8   GLU  97   --   111*  117*  108*   --   133*    < > = values in this interval not displayed.        Recent Labs   Lab  03/18/19   0916  03/22/19   0808  03/24/19   0818   ALT  36  34  47   AST  80*  81*  97*   ALB  2.0 possible fluid o/l    # Acute encephalopathy- suspect related to infectious process, fevers, meds (fentanyl patch, benzos); ammonia wnl  -MRI brain without mets or infarct preivous admit  -wife reports MS better today than prior admit and prior days  -foll

## 2019-03-25 NOTE — SLP NOTE
SPEECH DAILY NOTE - INPATIENT    ASSESSMENT & PLAN   ASSESSMENT  Pt seen for dysphagia tx to assess tolerance of recommended diet, ensure adherence to aspiration precautions, and provide pt/family education.  RN reported that the pt continues to require cue soft chopped consistency and thin liquids without overt signs or symptoms of aspiration with 100 % accuracy over 1-2 session(s).   In Progress   Goal #2 The patient/family/caregiver will demonstrate understanding and implementation of aspiration precautions

## 2019-03-25 NOTE — PLAN OF CARE
Problem: Impaired Swallowing  Goal: Minimize aspiration risk  Interventions:  - Chopped solids and thin liquids  - No straws  - Patient should be ALERT and upright for all feedings (90 degrees preferred). Do not feed pt if not alert.   - Offer food and liqu

## 2019-03-25 NOTE — OCCUPATIONAL THERAPY NOTE
OCCUPATIONAL THERAPY TREATMENT NOTE - INPATIENT     Room Number: 690/298-T  Session: 2  Number of Visits to Meet Established Goals: 5    Presenting Problem: sepsis due to PNA    History related to current admission: Pt was admitted from home on 3/18/2019 w 123  Heart Rate Source: Monitor     BP: 124/84             O2 SATURATIONS                ACTIVITIES OF DAILY LIVING ASSESSMENT  AM-PAC ‘6-Clicks’ Inpatient Daily Activity Short Form  How much help from another person does the patient currently need…  -   P weakness. Pt continues to present w/ deficits in memory, problem solving, safety, endurance, strength and overall deconditioning impacting performance and (I) w/ ADL's, IADL'S and functional mobility.  Pt is progressing w/ stated goals and tolerated OT sess

## 2019-03-25 NOTE — PLAN OF CARE
Pt AOx1. Drowsy this AM, but more alert later on after being washed up. Following commands this AM. Needs redirecting with tasks. Took pills in applesauce. Appetite improving per wife.    Repeat blood cx (one from port, one peripheral) taken this AM since p

## 2019-03-25 NOTE — PAYOR COMM NOTE
--------------  CONTINUED STAY REVIEW    Payor: HANNAH CHARLTON  Subscriber #:  [id number]  Authorization Number: [id number]    Admit date: 3/18/19  Admit time: [address]    Admitting Physician: Janis Lorenz MD  Attending Physician:  Janis Lorenz MD cxs NG x2d, cx from port r/o line infection; Ucx NG; PCT 2.62; follow temps    -can consider neuro input if needed but doubt cva, last admit MRI ok, no focal weakneess  -fentanyl patch recently started (lowest dose) but wife says helping for pain much more metastases as acute phase reactant, not due to iron stores  - recommend iron infusion with venofer- order placed   - transfuse to keep hgb > 7     5.  Weakness  - improved with steroids     A&O x 1 - very agitated, restless and paranoid - HR 140s - Seroquel

## 2019-03-25 NOTE — PLAN OF CARE
Impaired Functional Mobility    • Achieve highest/safest level of mobility/gait Not Progressing          PAIN - ADULT    • Verbalizes/displays adequate comfort level or patient's stated pain goal Progressing        RISK FOR INFECTION - ADULT    • Absence o

## 2019-03-25 NOTE — PROGRESS NOTES
[facility] Hospitalist Progress Note     PCP: Donato Patton MD    Chief Complaint: follow-up    Overnight/Interim Events:  Temp 100.8      SUBJECTIVE:  Pt more awake, sitting in chair, sister at bedside stating he thinks \"they're against him\".  Pt asking about o 0.57*  0.57*  0.56*  0.56*  0.51*  0.51*  0.52*  0.68*  0.64*   CA  10.6*   --   10.5*  10.7*  11.0*   --   10.9*   --    MG  2.0   --   1.9   --    --    --   1.8  2.1   GLU  97   --   111*  117*  108*   --   133*   --     < > = values in this interval no supp o2 as needed, on 3L; was set up c home O2; O2 requirements have increased  - lasix as needed for possible fluid o/l    # Acute encephalopathy- suspect related to infectious process, fevers, meds (fentanyl patch, benzos); ammonia wnl  -MRI brain withou

## 2019-03-25 NOTE — PROGRESS NOTES
[facility]  TEL: [phone]  FAX: [phone]    Derrick Byrd Patient Status:  Inpatient    10/10/1965 MRN [id number]   [facility] 4NW-A Attending Gayle Boston MD   [facility] Day # 7 PCP Nova Suárez --   391*   AST  81*   --    --   97*   ALT  34   --    --   47   BILT  2.0   --    --   2.3*   TP  5.9*   --    --   6.6       Microbiology    Reviewed in EMR,   Hospital Encounter on 03/18/19   1.  BLOOD CULTURE     Status: None    Collection Time: 03/18

## 2019-03-25 NOTE — PROGRESS NOTES
[facility]  Report of Psychiatric Progress Note    Pam Montes Patient Status:  Inpatient    10/10/1965 MRN [id number]   [facility] 4NW-A Attending Tito Oneil DO   Hosp Day # 7 PCP Mary Oates MD     Date of Admission: 3/18 lower doses of Klonpin at 0.5mg twice a day and being OFF seroquel since 3/17/19. He had 4-5 hrs this afternoon fully alert. He has appeared anxious and depressed and fatigue and lacking motivation per wife.  He is drowsy and does not want to talk at this t never used smokeless tobacco. He reports that he does not drink alcohol or use drugs.     Allergies:  No Known Allergies    Medications:    Current Facility-Administered Medications:   •  QUEtiapine Fumarate (SEROQUEL) tab 25 mg, 25 mg, Oral, BID PRN  •  cl confused    Thought process: disorganized  Thought content: no suicidal ideation    Orientation: self and aware of his cancer diagnosis  Attention and Concentration: poor, unable to spell WORLD backwards or perform serial 7's  Memory:  intact remote  Langu

## 2019-03-25 NOTE — PHYSICAL THERAPY NOTE
PHYSICAL THERAPY TREATMENT NOTE - INPATIENT    Room Number: 997/128-S     Session: 2   Number of Visits to Meet Established Goals: 5     History related to current admission: Pt was admitted from home on 3/18/2019 with Hypoxemia, hyperbilirubinema, AMS an to rate          BALANCE                                                                                                                     Static Sitting: Fair -  Dynamic Sitting: Fair -           Static Standing: Poor +  Dynamic Standing: Poor +    ACTI pt and min A to sit. Pt left in room with sister and Dr. Deandra Colon. HR increased to 123 with activity this date, O2 sats at 96% post ambulation. BP assessed 124/84.       The patient scores 3/20 on the Elderly Mobility Scale, indicating patient is depende

## 2019-03-26 NOTE — PROGRESS NOTES
Pt restless, confused, refusing to take nite meds. Stating he was \"kidnapped\". Reviewed events of hospitalization and spoke to his wife on the phone. Sat at bedside to take his meds. Some coughing/gagging noted. Posey vest on.   Stood with assistance

## 2019-03-26 NOTE — PROGRESS NOTES
[facility]  TEL: [phone]  FAX: [phone]    Jeseeddie Jaison Patient Status:  Inpatient    10/10/1965 MRN [id number]   [facility] 4NW-A Attending Alena King MD   [facility] Day # 8 PONCHO Dennis 136   --   133*   --   135*   K  3.8  3.8  3.8  4.0  3.9   --   4.0   CL  102  100   --   96*   --   99   CO2  28.0  30.0   --   28.0   --   28.0   ALKPHO  320*   --    --   391*   --   337*   AST  81*   --    --   97*   --   91*   ALT  34   --    --   47 follow new Bcx in view of new fever  - up to chair as able, cont to work with PT. IS  D/w RN  Could go from my standpoint in 1-2 days as long as remains stable and new Bcx neg.     Darling Rangel

## 2019-03-26 NOTE — CONSULTS
[facility]    PATIENT'S NAME: Anthony Gipson   ATTENDING PHYSICIAN: Shawna Dawson. JEROMY Deleon: Jami Peña M.D.    PATIENT ACCOUNT#:   [de-identified]    LOCATION:  [address] [place] [address]  MEDICAL RECORD #:   [id number]       DATE OF BIR supplemental oxygen. PAST MEDICAL HISTORY:  Metastatic gastric cancer, altered mental status, hyperbilirubinemia, fevers, delirium, hypoxia, sepsis, pulmonary infiltrates, episodes of pneumonia.       MEDICATIONS:  Present medications include antibiotics

## 2019-03-26 NOTE — CONSULTS
[facility] Cardiology Consultation NoteLoradonay Demarco MD    The patient was interviewed, examined, the chart was reviewed and the consult was dictated. This is a 48year old male with a chief complaint of increased heart rate. Impression:  1.   Sinus tachycardia

## 2019-03-26 NOTE — PROGRESS NOTES
[facility]  Report of Psychiatric Progress Note    Jassi Zeng Patient Status:  Inpatient    10/10/1965 MRN [id number]   [facility] 4NW-A Attending Zulma Rtoh, DO   Hosp Day # 6 PCP Shira Rosas MD     Date of Admission: 3/18 recently) for pain relief, he has been slower cognitively and motor wise. His lethargy has decreased with the lower doses of Klonpin at 0.5mg twice a day and being OFF seroquel since 3/17/19. He had 4-5 hrs this afternoon fully alert.  He has appeared anxio glasses at all times     Past Surgical History:   Procedure Laterality Date   • HERNIA SURGERY      LIH   • OTHER SURGICAL HISTORY      R thumb partial amputation   • OTHER SURGICAL HISTORY      L index partial amputation   • PORT, INDWELLING, IMP patch, 1 patch, Transdermal, Q72H    Review of Systems   Constitutional: Positive for malaise/fatigue. Psychiatric/Behavioral: Positive for depression. The patient is nervous/anxious.       Mental Status Exam:     Objective       03/26/19  0953   BP: 141/

## 2019-03-26 NOTE — DIETARY NOTE
[facility]    NUTRITION FOLLOW-UP ASSESSMENT    Pt does not meet malnutrition criteria.     NUTRITION DIAGNOSIS/PROBLEM:    Inadequate oral intake related to inability to meet sufficient needs as evidenced by low PO intake.-improving    NUTRITION INTER time of visit. Discussed PO intake with pt's sister. Pt's sister reports that he has low appetite, she ordered him dinner last night but is doubtful that he ate any of it. She reports patient does not c/o nausea or vomiting.  She reports minimal intake at m ml/kcal or per MD discretion    MONITOR AND EVALUATE/NUTRITION GOALS:    1. PO intake to meet at least 75% patient nutrition prescription  2. At least 75% intake of oral supplements  3. No signs of skin breakdown  4.  Maintain lean body mass    MEDICATIONS:

## 2019-03-26 NOTE — PROGRESS NOTES
[facility] Hospitalist Progress Note     PCP: Muna Tellez MD    Chief Complaint: follow-up    Overnight/Interim Events:    Needed posey vest o/n, agitated    SUBJECTIVE:  Pt awake, family at bedside. Denies pain. On 3L. tachy    OBJECTIVE:  Temp:  [98.7 °F (37. 0.52*  0.52*   CA  10.5*  10.7*  11.0*   --   10.9*   --   10.2*   MG  1.9   --    --    --   1.8  2.1   --    GLU  111*  117*  108*   --   133*   --   111*       Recent Labs   Lab  03/22/19   0808  03/24/19   0818  03/26/19   0522   ALT  34  47  43   AST d/w Atelectasis and hypoventialtion  - supp o2 as needed, on 3L; was set up c home O2; O2 requirements have increased  - lasix as needed for possible fluid o/l    # Acute encephalopathy- suspect related to infectious process, fevers, meds (fentanyl patch,

## 2019-03-26 NOTE — PAYOR COMM NOTE
--------------  CONTINUED STAY REVIEW    Payor: HANNAH PPHECTOR  Subscriber #:  [id number]  Authorization Number: [id number]    Admit date: 3/18/19  Admit time: [address]    Admitting Physician: Neil Evans MD  Attending Physician:  Neil Evans MD

## 2019-03-26 NOTE — CONSULTS
[address] Follow Up    Wilton Kruse  [id number]  Patient seen at: [facility]     Request per Dr. Miguel Ángel Alvarez to re-consult to assist with pain management/getting off fentanyl patch to help with drowsiness and delirium.  Pall 10 mg, Intravenous, Q8H PRN  •  HYDROcodone-acetaminophen (NORCO) 5-325 MG per tab 1 tablet, 1 tablet, Oral, Q4H PRN **OR** HYDROcodone-acetaminophen (NORCO) 5-325 MG per tab 2 tablet, 2 tablet, Oral, Q4H PRN  •  fentaNYL (DURAGESIC) 12 MCG/HR 1 patch, 1 p Physical Exam:     Vital Signs: /75   Pulse (!) 130   Temp 99 °F (37.2 °C)   Resp 18   Ht 6' (1.829 m)   Wt 199 lb 8 oz (90.5 kg)   SpO2 93%   BMI 27.06 kg/m²     General: awake but drowsy and in no apparent distress.   Body habitus: abdomen r discussion, he closed his eyes and dozed off again. Per his SPARKLE he has been more tired since returning from a test this morning but per all the other notes he has been drowsy much of the time.      I discussed that Dr. Pati Cordova has requested Palliative Care to acquired pneumonia of right lung     Community acquired pneumonia of right lung, unspecified part of lung     Malignant neoplasm of cardia of stomach ([facility] [address]     Dehydration     Sepsis due to pneumonia ([facility] [address]     Altered mental status, unspecified altered men

## 2019-03-26 NOTE — PROGRESS NOTES
Hematology/Oncology  Progress Note        NAME: Mook Listen - ROOM: 470/446-Z - MRN: [id number] - Age: 48year old - : 10/10/1965    Subjective  Improved but still mildly confused  Needed restraints overnight    Medications:  • QUEtiapine Fumarate  12 GFRNAA  120  120  124  124  123  111  113  123  123   CA  10.7*  11.0*   --   10.9*   --   10.2*   ALB  1.7*   --    --   1.9*   --   1.8*   NA  136  136   --   133*   --   135*   K  3.8  3.8  3.8  4.0  3.9   --   4.0   CL  102  100   --   96*   --   99 questions. Darrion Smith M.D.   Hematology and Oncology   [facility]

## 2019-03-26 NOTE — VIDEO SWALLOW STUDY NOTE
ADULT VIDEOFLUOROSCOPIC SWALLOWING STUDY    Admission Date: 3/18/2019  Evaluation Date: 03/26/19  Radiologist: Andrei Lester     RECOMMENDATIONS   Diet Recommendations - Solids: Mechanical soft chopped  Diet Recommendations - Liquid:  Thin    Further Follow-up:  Foll chair. Patient Viewed: Lateral.  Patient Alertness: Fully alert. Consistencies Presented: Thin liquids;Puree;Hard solid to assess oropharyngeal swallow function and assess for compensatory strategies to improve safe swallow function.     THIN LIQUIDS  Met educate pt/family/caregivers on compensatory strategies/swallow precautions. GOALS  Goal #1 The patient will tolerate mechanical chopped consistency and thin liquids without overt signs or symptoms of aspiration with 100 % accuracy over 1-2 session(s).

## 2019-03-26 NOTE — PLAN OF CARE
Pt AOx1. Very restless this AM, trying to get out of bed, pulling leads. [place] vest discontinued around 7am. Able to redirect pt at this time. Wife at bedside this morning which also helps pt follow commands better. Able to take AM meds this morning.  Xray

## 2019-03-27 NOTE — HOME CARE LIAISON
Following patient for discharge disposition. Received referral for Palliative, ECIN submitted, thanks.

## 2019-03-27 NOTE — PROGRESS NOTES
[facility]  Report of Psychiatric Progress Note    Tio Bautista Patient Status:  Inpatient    10/10/1965 MRN [id number]   [facility] 4NW-A Attending Gisela Crook, DO   Hosp Day # 5 PCP Doris Valente MD     Date of Admission: 3/18 recently) for pain relief, he has been slower cognitively and motor wise. His lethargy has decreased with the lower doses of Klonpin at 0.5mg twice a day and being OFF seroquel since 3/17/19. He had 4-5 hrs this afternoon fully alert.  He has appeared anxio [facility])     not primary    • Mononucleosis 2010   • Muscle weakness    • Personal history of antineoplastic chemotherapy    • Pneumonia due to organism    • Pulmonary embolism (HCC)    • Visual impairment     glasses at all times     Past Surgical History: Rectal, Once PRN  •  ondansetron HCl (ZOFRAN) injection 4 mg, 4 mg, Intravenous, Q6H PRN  •  Metoclopramide HCl (REGLAN) injection 10 mg, 10 mg, Intravenous, Q8H PRN  •  HYDROcodone-acetaminophen (NORCO) 5-325 MG per tab 1 tablet, 1 tablet, Oral, Q4H PRN *

## 2019-03-27 NOTE — PHYSICAL THERAPY NOTE
PHYSICAL THERAPY TREATMENT NOTE - INPATIENT    Room Number: 814/820-C     Session: 3  Number of Visits to Meet Established Goals: 5     History related to current admission: Pt was admitted from home on 3/18/2019 with Hypoxemia, hyperbilirubinema, AMS and rate  Location: abdomen  Management Techniques:  Body mechanics;Breathing techniques;Repositioning    BALANCE                                                                                                                     Static Sitting: Fair -  Dynamic Pt reports fatigue and is unable /unsafe to ambulate . Pt left in chair, needs met.    BP seated 108/87 (previous reading on monitor from /81 in supine)   BP standing 96/66  BP seated p session 108/73      THERAPEUTIC EXERCISES  Lower Extremity Alte

## 2019-03-27 NOTE — PROGRESS NOTES
[facility] Hospitalist Progress Note     PCP: Estefania Ryan MD    Chief Complaint: follow-up    Overnight/Interim Events:      SUBJECTIVE:  Pt awake, eating lunch. Some pain. Pt reported earlier to sister anxious as in hospital, better when out. On 2L.  Walked yes 28.0  30.0   --   28.0   --   28.0   --    BUN  12  10  10   --   12   --   13   --    CREATSERUM  0.57*  0.57*  0.56*  0.56*  0.51*  0.51*  0.52*  0.68*  0.64*  0.52*  0.52*  0.48*   CA  10.5*  10.7*  11.0*   --   10.9*   --   10.2*   --    MG  1.9   -- CBD dilation  -initial c/f choledocholithiasis/cholangitis  -apprec GI eval  -innumerable liver mets  -MRi/MRCP without obstruction lesion in CBD  -ammonia <10     # Hypoxia  - d/w Atelectasis and hypoventialtion  - supp o2 as needed, on 3L; was set up c h

## 2019-03-27 NOTE — PROGRESS NOTES
[facility]  TEL: [phone]  FAX: [phone]    Joanna Taylor Patient Status:  Inpatient    10/10/1965 MRN [id number]   [facility] 4NW-A Attending Jordy Barboza MD   [facility] Day # 9 PONCHO Ojeda --   10.2*   --    ALB  1.7*   --    --   1.9*   --   1.8*   --    NA  136  136   --   133*   --   135*   --    K  3.8  3.8  3.8  4.0  3.9   --   4.0   --    CL  102  100   --   96*   --   99   --    CO2  28.0  30.0   --   28.0   --   28.0   --    [address] admission as well     PLAN:     - cont to follow off abx since all cx have been neg, no bacterial infection has been identified.   - follow new Bcx in view of new fever, so far neg  - up to chair as able, cont to work with PT. IS  D/w RN  Could go from my s

## 2019-03-27 NOTE — PROGRESS NOTES
[facility] Cardiology Progress Note - Geoffery Blizzard Patient Status:  Inpatient    10/10/1965 MRN [id number]   [facility] 4NW-A Attending Olga Kemp MD   Hosp Day # 9 PCP Mary Oates MD     Subjective:  Leslie Herrera (96.6 kg)  03/06/19 1009 : 213 lb (96.6 kg)  02/26/19 1117 : 213 lb 3.2 oz (96.7 kg)      Tele: NSR    Physical Exam:    General: Alert and oriented x 3. No apparent distress. No respiratory or constitutional distress.   HEENT: Normocephalic, anicteric scle input(s): TROP in the last 168 hours.     Allergies:   No Known Allergies    Medications:    Current Facility-Administered Medications:  Morphine Sulfate (Concentrate) concentrated solution 2.6-5 mg 2.6-5 mg Oral Q4H   Morphine Sulfate (Concentrate) concent Musculoskeletal: no joint complaints upper or lower extremities  Neuro: no sensory or motor complaint  Psyche: no symptoms of depression or anxiety  Hematology: denies bruising or excessive bleeding  Endocrine: no history of diabetes  Allergy: see above

## 2019-03-27 NOTE — PROGRESS NOTES
[facility]  Progress Note    Amberly Hoffmann Patient Status:  Inpatient    10/10/1965 MRN [id number]   [facility] 4NW-A Attending Candi Kilgore MD   Hosp Day # 9 PCP Deb Pham MD     Subjective:    Feels better this am    Marily English per ID and pulm   Afebrile now     3. Leukocytosis - mainly neutrophilia  Likely reactive to infection or tumor inflammation/ now on prednisone     4.  Anemia - microcytic   Likely related to GI bleed from gastric mass   Will send for iron studies  - sarina

## 2019-03-27 NOTE — PALLIATIVE CARE NOTE
[address] Follow Up    Carmelo Cornelius  [id number]  Patient seen at: [facility]    Subjective:      No pain, clearer mind today. Patient seen and evaluated, family, wife Chava Moore at bedside.      Review of Systems:   Pall tab 1 tablet, 1 tablet, Oral, Q4H PRN **OR** HYDROcodone-acetaminophen (NORCO) 5-325 MG per tab 2 tablet, 2 tablet, Oral, Q4H PRN    No current outpatient medications on file.   Labs/ imaging     Hematology:  Lab Results   Component Value Date    WBC 26.6 ( 94%   BMI 27.24 kg/m²     General: awake and in no apparent distress. Smiling, eating breakfast. Making a few jokes. HEENT: Normocephalic, sclera anicteric, no injection.  Oral mucous membranes moist   Cardiac: tachycardic   Lungs: Normal excursions and e rested well last night per notes. Courtney Maguire feels he is more interactive today and is taking less time to think about conversations or react to requests. They are both satisfied with the current plan for pain management.      Community Palliative Care was again o symptom management. Continued goal is to get patient stronger to be able to receive chemo. Order placed for SW to arrange and will place on DC instructions. 2. CODE STATUS: FULL CODE  3.  Pain/Mental status changes: denies pain since the fentanyl patch wa

## 2019-03-28 NOTE — PROGRESS NOTES
[facility] Follow Up     Tio Bautista  [id number]  Hospital Day #10  Date of Consult: 03/20/19  Patient seen at: [facility]     Subjective:      Patient was seen with his wife at the bedside. Daily PRN  •  bisacodyl (DULCOLAX) rectal suppository 10 mg, 10 mg, Rectal, Daily PRN  •  FLEET ENEMA (FLEET) 7-19 GM/118ML enema 133 mL, 1 enema, Rectal, Once PRN  •  ondansetron HCl (ZOFRAN) injection 4 mg, 4 mg, Intravenous, Q6H PRN  •  Metoclopramide H perform job Full Normal or   Reduced Full   60 Reduced Significant disease  Can’t perform hobby Occasional  Assist Normal or   Reduced Full or confused   50 Mainly sit/lie Extensive Disease  Can’t do any work   Partial Assist Normal or Reduced Full or conf for Roxanol 2.6-5 mg as needed every 4 hours as needed. His pain medication needs have been minimal since discontinuation of the Fentanyl patch.    2. CODE STATUS:  FULL CODE    - Psychosocial: Emotional support provided to the wife    - Disposition: [facility]dra

## 2019-03-28 NOTE — PROGRESS NOTES
[facility] Hospitalist Progress Note     PCP: Marlee Osler, MD    Chief Complaint: follow-up    Overnight/Interim Events:      SUBJECTIVE:  Pt sitting in chair. On 3L. Walked.  Better at night in terms of agitation per staff, some confusion in day although wife st --   99   --    --    CO2  28.0  30.0   --   28.0   --   28.0   --    --    BUN  10  10   --   12   --   13   --    --    CREATSERUM  0.56*  0.56*  0.51*  0.51*  0.52*  0.68*  0.64*  0.52*  0.52*  0.48*  0.62*   CA  10.7*  11.0*   --   10.9*   --   10.2* admit    Elevated bili, AST, AP, and CBD dilation  -initial c/f choledocholithiasis/cholangitis  -apprec GI eval  -innumerable liver mets  -MRi/MRCP without obstruction lesion in CBD  -ammonia <10     # Hypoxia  - d/w Atelectasis and hypoventialtion  - sup

## 2019-03-28 NOTE — PLAN OF CARE
Pt AOx1-2. HR still in 120s. Up in chair this AM. Moderate appetite. Following commands. Morphine q4h if pt awake or c/o pain. Declined pain/dose this AM. Will continue to monitor.

## 2019-03-28 NOTE — PROGRESS NOTES
[facility]  Progress Note    Reena Castillo Patient Status:  Inpatient    10/10/1965 MRN [id number]   [facility] 4NW-A Attending Yovani Gaona MD   Hosp Day # 10 PCP Lisa Fernandez MD     Subjective:    Feels better this am  Sittin prednisone   Now observing off antibiotics per ID and pulm   Afebrile now     3. Leukocytosis - mainly neutrophilia  Likely reactive to infection or tumor inflammation/ now on prednisone     4.  Anemia - microcytic   Likely related to GI bleed from gastric

## 2019-03-28 NOTE — PROGRESS NOTES
[facility]  Report of Psychiatric Progress Note    Brianna Red Patient Status:  Inpatient    10/10/1965 MRN [id number]   [facility] 4NW-A Attending Oj Alexandra, DO   Hosp Day # 8 PCP Mallorie Bowen MD     Date of Admission: 3/1 day and being OFF seroquel since 3/17/19. He had 4-5 hrs this afternoon fully alert. He has appeared anxious and depressed and fatigue and lacking motivation per wife. He is drowsy and does not want to talk at this time.      Interval Hx:  3/25/19- Sascha Lung mononucleosis hepatitis 2010   • Liver cancer [facility])     not primary    • Mononucleosis 2010   • Muscle weakness    • Personal history of antineoplastic chemotherapy    • Pneumonia due to organism    • Pulmonary embolism (HCC)    • Visual impairment     gla (FLEET) 7-19 GM/118ML enema 133 mL, 1 enema, Rectal, Once PRN  •  ondansetron HCl (ZOFRAN) injection 4 mg, 4 mg, Intravenous, Q6H PRN  •  Metoclopramide HCl (REGLAN) injection 10 mg, 10 mg, Intravenous, Q8H PRN  •  HYDROcodone-acetaminophen (Nonah Bulla) 5-325 M

## 2019-03-28 NOTE — PHYSICAL THERAPY NOTE
PHYSICAL THERAPY TREATMENT NOTE - INPATIENT    Room Number: 651/698-A     Session: 4  Number of Visits to Meet Established Goals: 5     History related to current admission: Pt was admitted from home on 3/18/2019 with Hypoxemia, hyperbilirubinema, AMS and 0  Location: abdomen  Management Techniques:  Body mechanics;Breathing techniques;Repositioning    BALANCE                                                                                                                     Static Sitting: Fair -  Dynamic Si THERAPEUTIC EXERCISES  Lower Extremity Alternating marching  Ankle pumps  Knee extension     Upper Extremity      Position Sitting     Repetitions   10   Sets   1     Patient End of Session: Up in chair;Needs met;Call light within reach;RN aware of sessi

## 2019-03-29 NOTE — PROGRESS NOTES
[facility] Hospitalist Progress Note     PCP: Gary Avila MD    Chief Complaint: follow-up    Overnight/Interim Events:      SUBJECTIVE:  Pt laying in bed, napping but awoke for visit. Denies pain. Mom reports didn't 'have good night.  No n/v.     -120s, 0.51*  0.51*  0.52*  0.68*  0.64*  0.52*  0.52*  0.48*  0.62*  0.48*   CA  11.0*   --   10.9*   --   10.2*   --    --    --    MG   --    --   1.8  2.1   --    --    --    --    GLU  108*   --   133*   --   111*   --    --    --        Recent Labs   Lab  0 requirements have increased  - lasix as needed for possible fluid o/l    # Acute encephalopathy- suspect related to infectious process, fevers, meds (fentanyl patch, benzos); ammonia wnl  -MRI brain without mets or infarct preivous admit  -MS now improved

## 2019-03-29 NOTE — PLAN OF CARE
Impaired Swallowing    • Minimize aspiration risk Progressing        RESPIRATORY - ADULT    • Achieves optimal ventilation and oxygenation Progressing        SAFETY ADULT - FALL    • Free from fall injury Progressing          Impaired Swallowing    • Minim

## 2019-03-29 NOTE — PAYOR COMM NOTE
--------------  CONTINUED STAY REVIEW    Payor: [employer]  Subscriber #:  [id number]  Authorization Number: [id number]    Admit date: 3/18/19  Admit time: [address]    Admitting Physician: Vijay Sylvester MD  Attending Physician:  Vijay Sylvester MD fever/anxiety/anxiety  -apprec cards recs, consulted given persistence  -echo EF 65=70%     #Coughing and choking with eating  - speech path eval      Dispo: med/onc, poor prognosis.   Plan [facility] on discharge.  -palliative care c/s, appreciated  -DC planning,

## 2019-03-29 NOTE — PALLIATIVE CARE NOTE
[address] Follow Up    Bradley Prathero  [id number]  Patient seen at: [facility]    Subjective:      Describing episode last night, states he remembers it but states he was just trying to replace his O2 and doesn't think WBC 26.6 (H) 03/26/2019    HGB 7.2 (L) 03/26/2019    HCT 23.8 (L) 03/26/2019    .0 03/26/2019       Coags:  Lab Results   Component Value Date    INR 2.66 (H) 03/06/2019    PTT 52.3 (H) 03/06/2019       Chemistry:  Lab Results   Component Value D Performance Scale   % Ambulation Activity Level Self-Care Intake Consciousness   100 Full Normal Full   Normal Full   90 Full No disease  Normal Full Normal Full   80 Full Some disease  Normal w/effort Full Normal or  Reduced Full   70 Reduced Some disease Number: [phone]       Disposition: SNF palliative care with [facility] following to home.      Problem List   Patient Active Problem List:     Nonspecific elevation of levels of transaminase or lactic acid dehydrogenase (LDH)     Other

## 2019-03-29 NOTE — PROGRESS NOTES
[facility]  Progress Note    María Scott Patient Status:  Inpatient    10/10/1965 MRN [id number]   [facility] 4NW-A Attending Janis Lorenz MD   [facility] Day # 6 PCP Wm Velasquez MD     Subjective:    Sleeping this am    Liya disease. Plan was for chemo but given his condition with poor performance status (3-4),  he is not in a condition to take chemotherapy at this time. Family not ready to consider palliative care or hospice at this time.  He can be discharged to [facility] or home w

## 2019-03-29 NOTE — OCCUPATIONAL THERAPY NOTE
915 - Attempted to see pt this AM, pt occupied with APN, OT will follow up later. 1035 - Attempted to see pt this AM, pt just returned to bed and is sleeping, wife requesting OT follow up this PM, Ot will follow up as schedule permits.

## 2019-03-29 NOTE — OCCUPATIONAL THERAPY NOTE
OCCUPATIONAL THERAPY TREATMENT NOTE - INPATIENT     Room Number: 250/917-D  Session: 3   Number of Visits to Meet Established Goals: 5    Presenting Problem: sepsis due to PNA    History related to current admission: Pt was admitted from home on 3/18/2019 TOLERANCE                        O2 SATURATIONS                ACTIVITIES OF DAILY LIVING ASSESSMENT  AM-PAC ‘6-Clicks’ Inpatient Daily Activity Short Form  How much help from another person does the patient currently need…  -   Putting on and taking off r rehabilitation(ELOS 11-14 days)       PLAN  OT Treatment Plan: Balance activities; Energy conservation/work simplification techniques;ADL training;IADL training;Continued evaluation; Compensatory technique education;Equipment eval/education;Patient/Family tr

## 2019-03-30 NOTE — PLAN OF CARE
RISK FOR INFECTION - ADULT    • Absence of fever/infection during anticipated neutropenic period Progressing              Assumed pt care at 0730. Alert, awake, confused. Denies pain. Up to chair with assist. Tolerating po intake. Spouse at bedside.

## 2019-03-30 NOTE — PROGRESS NOTES
[facility] Hospitalist Progress Note     PCP: Mallorie Bowen MD    Chief Complaint: follow-up    Overnight/Interim Events:      SUBJECTIVE:  Feeling better  Low grade temps, still tachycardic  Still with some confusion at times    OBJECTIVE:  Temp:  [98.2 °F (36.8 --    --    --   11   CREATSERUM  0.52*  0.68*  0.64*  0.52*  0.52*  0.48*  0.62*  0.48*  0.60*  0.60*   CA   --   10.9*   --   10.2*   --    --    --   11.4*   MG   --   1.8  2.1   --    --    --    --   1.6   GLU   --   133*   --   111*   --    --    - eval  -innumerable liver mets  -MRi/MRCP without obstruction lesion in CBD  -ammonia <10     # Hypoxia  - d/w Atelectasis and hypoventialtion  - supp o2 as needed, on 3L; was set up c home O2; O2 requirements have increased  - lasix as needed for possible

## 2019-03-31 NOTE — PROGRESS NOTES
[facility] Hospitalist Progress Note     PCP: Leo Dotson MD    Chief Complaint: follow-up    Overnight/Interim Events:      SUBJECTIVE:  Doing well  No acute events  Some intermittent confusion  No fevers    OBJECTIVE:  Temp:  [98.4 °F (36.9 °C)-98.8 °F (37.1 CREATSERUM  0.64*  0.52*  0.52*  0.48*  0.62*  0.48*  0.60*  0.60*  0.54*   CA   --   10.2*   --    --    --   11.4*  11.0*   MG  2.1   --    --    --    --   1.6  1.8   GLU   --   111*   --    --    --   90  95       Recent Labs   Lab  03/26/19   0522 <10     # Hypoxia  - d/w Atelectasis and hypoventialtion  - supp o2 as needed, on 3L; was set up c home O2; O2 requirements have increased  - lasix as needed for possible fluid o/l    # Acute encephalopathy- suspect related to infectious process, fevers, m

## 2019-03-31 NOTE — PLAN OF CARE
Delirium    • Minimize duration of delirium Progressing        Impaired Functional Mobility    • Achieve highest/safest level of mobility/gait Progressing        Impaired Swallowing    • Minimize aspiration risk Progressing        RESPIRATORY - ADULT    •

## 2019-03-31 NOTE — PLAN OF CARE
Received call from MICHAEL that [facility] unable to take pt today due to insurance auth and prn posey. MICHAEL notified that restraints not in use and haven't been for several days. Updated pt and wife. Dr Evelina Gray notified. Plan for DC tomorrow. 1430:  Wife states

## 2019-03-31 NOTE — PROGRESS NOTES
Alert and confused thru the night, pulled out PAC needle, needle replaced without incident.  Continues on IVF, afebrile this shift, Tele, ST, 3 liters nasal cannula, pt pulls off repeatedly thru the night, denies pain, incontinent, fall and safety precautio

## 2019-03-31 NOTE — SLP NOTE
SPEECH DAILY NOTE - INPATIENT    ASSESSMENT & PLAN   ASSESSMENT  Pt seen for dysphagia tx to assess tolerance with recommended diet, ensure proper utilization of aspiration precautions and provide pt/family education.  Patient seen with recommended diet of including Slow rate, Small bites, Small sips, Alternate liquids/solids, Eliminate distractions, Supervision with meals with min assistance 100 % of the time across 1-2 sessions.   Met   Goal #4  The patient will tolerate trial upgrade of regular consistency

## 2019-04-01 NOTE — OCCUPATIONAL THERAPY NOTE
OCCUPATIONAL THERAPY TREATMENT NOTE - INPATIENT     Room Number: 822/360-S  Session: 4   Number of Visits to Meet Established Goals: 5    Presenting Problem: sepsis due to PNA    History related to current admission: Pt was admitted from home on 3/18/2019 TOLERANCE                        O2 SATURATIONS                ACTIVITIES OF DAILY LIVING ASSESSMENT  AM-PAC ‘6-Clicks’ Inpatient Daily Activity Short Form  How much help from another person does the patient currently need…  -   Putting on and taking off r supervision-mod (I) level in BADL and trasnfers. OT Discharge Recommendations: Sub-acute rehabilitation(ELOS 11-14 days)       PLAN  OT Treatment Plan: Balance activities; Energy conservation/work simplification techniques;ADL training;IADL training;Co

## 2019-04-01 NOTE — PAYOR COMM NOTE
--------------  CONTINUED STAY REVIEW    Payor: [employer] [facility]  Subscriber #:  [id number]  Authorization Number: [id number]    Admit date: 3/18/19  Admit time: [address]    Admitting Physician: Emily Nguyen MD  Attending Physician:  Emily Nguyen MD 11.6  CALC   WBC 30.3  HGB 7.3  HCT 23.9  RBC 3.15  NEUTROPHILS 25.81    Assessment/plan  # Fevers, sepsis, weakness, mucle aches; pt with metastatic gastric cancer-liver mets  -admitted 3/6-3/12, fevers felt to be related to tumor; superimposed inf with all involved services  And placement found      APPROVED TO 3/30 - PLEASE PROVIDE ADDITIONAL APPROVED INPATIENT DAYS. THANK YOU.

## 2019-04-01 NOTE — PALLIATIVE CARE NOTE
Chart reviewed from the weekend. Noted DC planning issues preventing patient going to [facility]. D/W RN. No pain issues. Has not taking morphine in days. As goals of care have been established, Palliative Care will sign off.  Please re-consult if clinical con

## 2019-04-01 NOTE — PROGRESS NOTES
Awake and talkative around 0100. Incontinent of urine. Attempting to get out of bed. Calmer after brief changed. Given PM meds. Took without coughing or choking. IV infusing at 100cc/hr. Sleeping. HR in 100's, wears oxygen sporadically.   No signs o

## 2019-04-01 NOTE — PROGRESS NOTES
[facility] hospitalist daily note  Patient was seen/examined on 4/1/19    S; no chest pain, no SOB, no abd pain, no nausea  Denies bleeding      Medications in Epic    PE     04/01/19  1300   BP: 127/81   Pulse: 106   Resp: 20   Temp: 98.6 °F (37 °C)     Gen: denita PCP     #psychosis, depression/anxiety, agitation  Calm and cooperative during my visit  Seen by psychiatry during admit  Per psychiatry:   Recommendations:  1) Continue Seroquel 12.5mg nightly to prevent paranoid ideation and agitation.  Would stay on this

## 2019-04-01 NOTE — PHYSICAL THERAPY NOTE
PHYSICAL THERAPY TREATMENT NOTE - INPATIENT    Room Number: 244/588-C     Session: 5   Number of Visits to Meet Established Goals: 5    Presenting Problem: Hypoxemia, hyperbilirubinema, AMS and sepsis d/t PNA     History related to current admission: Pt w abdomen  Management Techniques:  Body mechanics;Breathing techniques;Repositioning    BALANCE                                                                                                                     Static Sitting: Poor -  Dynamic Sitting: Poor - assist of 2. Posterior lean in standing. Pt able to ambulate with shuffling steps from bed to chair with RW and mod assist of 2. Stand to sit with mod assist of 2 and quick descent. Sit to stand x 2 to place chair alarm.  Stand to sit with RW and mod dao standardized assessment within 1-2 visits  GOAL MET 3/25/19   Goal #5     Goal #6     Goal Comments: Goals established on 3/19/2019  Goals ongoing 4/1/19 with significant decline in mobility status since last visit on 3/29/19

## 2019-04-01 NOTE — PLAN OF CARE
A&O1-2. Afebrile, VSS. Pleasantly confused. Calm and cooperative with RN staff; no agitation noted. No anxiety voiced this shift. No complaints of pain. O2 2L NC with O2 sats in mid 90s. Tele NSR to low 100's. Tolerating diet, no N/V/D.  Regular consistency

## 2019-04-01 NOTE — PROGRESS NOTES
Pt drowsy and fatigued this shift, opening eyes to name but falling back to sleep. V.s.s., no signs of pain or distress, remains on Tele ST. Continues on IVF, incontinent in brief with adequate output. discharge pending, possibly today.  Fall and safety pre

## 2019-04-02 NOTE — DIETARY NOTE
[facility]    NUTRITION FOLLOW-UP ASSESSMENT    Pt does not meet malnutrition criteria.     NUTRITION DIAGNOSIS/PROBLEM:    Inadequate oral intake related to inability to meet sufficient needs as evidenced by low PO intake.-ongoing    NUTRITION INTERVE decision maker. All questions answered. 3/19-Patient is a 48year old male with a current diagnosis of metastatic gastric cancer, noted metastases on the liver. PMH includes diverticulosis, PNA, pulmonary embolism.  Pt has not started chemotherapy yet pe Yes    NUTRITION RELATED PHYSICAL FINDINGS:     1. Body Fat/Muscle Mass: BMI 27.97     2. Fluid Accumulation: none per MD note.     NUTRITION PRESCRIPTION: Based on IBW 80.9 kg  Calories: 9435-4423 calories/day (25-30 calories per kg)  Protein:  grams

## 2019-04-02 NOTE — PROGRESS NOTES
[facility] hospitalist daily note  Patient was seen/examined on 4/2/19     S; no chest pain, no SOB, no abd pain, no nausea  Denies bleeding   calm and cooperative NOT in restraints     Medications in Epic     PE. 98.3 1Gen: awake, alert, no respiratory distress visit  Seen by psychiatry during admit  Per psychiatry:   Recommendations:  1) Continue Seroquel 12.5mg nightly to prevent paranoid ideation and agitation. Would stay on this long term.      2) Continue Klonipin at 0.5mg in AM and 1.0mg at night. Would not

## 2019-04-02 NOTE — PROGRESS NOTES
[facility]  Report of Psychiatric Progress Note    Joanna Taylor Patient Status:  Inpatient    10/10/1965 MRN [id number]   [facility] 4NW-A Attending Sandy Bray DO   Hosp Day # 13 PCP Jim Ojeda MD     Date of Admission: 3/1 doses of Klonpin at 0.5mg twice a day and being OFF seroquel since 3/17/19. He had 4-5 hrs this afternoon fully alert. He has appeared anxious and depressed and fatigue and lacking motivation per wife. He is drowsy and does not want to talk at this time. Diagnosis Date   • Anxiety state, unspecified    • Cancer [facility])    • Diverticulosis    • Diverticulosis of large intestine    • Hepatitis     hepatitis B   • Infectious mononucleosis hepatitis 2010   • Liver cancer ([facility] [address]     not primary    • Mononucleosi hydroxide (MILK OF MAGNESIA) 400 MG/5ML suspension 30 mL, 30 mL, Oral, Daily PRN  •  bisacodyl (DULCOLAX) rectal suppository 10 mg, 10 mg, Rectal, Daily PRN  •  FLEET ENEMA (FLEET) 7-19 GM/118ML enema 133 mL, 1 enema, Rectal, Once PRN  •  ondansetron HCl (

## 2019-04-02 NOTE — PLAN OF CARE
NURSING DISCHARGE NOTE    Discharged Rehab facility via Ambulance. Accompanied by Support staff  Belongings Taken by patient/family. AVS, IP Transfer Report, and prescriptions printed and in envelope, provided to EMS to give to [facility] staff.

## 2019-04-02 NOTE — PAYOR COMM NOTE
--------------  CONTINUED STAY REVIEW    Payor: [employer]ANNAH CHARLTON  Subscriber #:  [id number]  Authorization Number: [id number]    Admit date: 3/18/19  Admit time: [address]    Admitting Physician: Celeste Edge MD  Attending Physician:  Celeste Edge MD

## 2019-04-02 NOTE — PLAN OF CARE
A&Ox1-2, pleasantly confused throughout the day. Reoriented PRN. Wife at the bedside. VSS- HR into 110's today with activity. Tmax 99.3 this afternoon; PRN Tylenol administered per orders. Tolerating diet, no N/V/D.  Better PO intake today than yesterday

## 2019-04-03 NOTE — ED INITIAL ASSESSMENT (HPI)
Patient was found on the ground next to his bed around 0230 this AM. Bed was approximately one foot off the ground. Patient recently discharged from [facility] last night.

## 2019-04-03 NOTE — ED PROVIDER NOTES
Patient Seen in: [facility] Emergency Department    History   Patient presents with:  Fall (musculoskeletal, neurologic)    Stated Complaint: Fall    HPI    Patient discharged from the hospital yesterday with gastric carcinoma metastatic to the live vital signs reviewed. All other systems reviewed and negative except as noted above.     Physical Exam     ED Triage Vitals [04/03/19 0429]   /77   Pulse (!) 121   Resp 22   Temp 97.8 °F (36.6 °C)   Temp src Temporal   SpO2 92 %   O2 Device Nasal RAINBOW DRAW LIGHT GREEN   RAINBOW DRAW GOLD   BLOOD CULTURE     CT brain: No acute findings      MDM     Patient with fall from bed from the nursing home, no obvious injury.   Patient definitely has ill appearance, however wife at the bedside notes that

## 2019-04-03 NOTE — PAYOR COMM NOTE
--------------  DISCHARGE REVIEW    Payor: HANNAH CHARLTON  Subscriber #:  [id number]  Authorization Number: [id number]    Admit date: 3/18/19  Admit time:  6914  Discharge Date: 4/2/2019  5:30 PM    Thank you for this inpatient authorization.        Admitting P

## 2019-04-04 NOTE — DISCHARGE SUMMARY
[facility]  Discharge Summary    Bradley Medico Patient Status:  Inpatient    10/10/1965 MRN [id number]   [facility] 4NW-A Attending No att. providers found   [facility] Day # 13 PCP Bruno Roth MD     Date of Admission: 3/18/2019    Date of nitrite/leukocytes  thought to be due to metastatic cancer        Abnormal LFTs, and CBD dilation  -initial c/f choledocholithiasis/cholangitis  -apprec GI eval  -innumerable liver mets  -MRi/MRCP without obstruction lesion in CBD  -ammonia <10     # Hypox placement found     Total time spent on discharge > 30 minutes      of note, per oncology: We had discussed that this is an incurable disease.  Plan was for chemo but given his condition with poor performance status (3-4), Rasheeda Cruz is not in a condition to take Disp-15 tablet, R-0Refills from Dr. Shira Rosas MD    !! clonazePAM 0.5 MG Oral Tab  Take 1 tablet (0.5 mg total) by mouth every morning., Owen, Disp-12 tablet, R-0    !! clonazePAM 1 MG Oral Tab  Take 1 tablet (1 mg total) by mouth nightly. , Owen, Disp- mental status changes   Do you want home health added to this order?:    No   Scheduling Instructions:       **THIS IS NOT A REFERRAL**    Your physician has referred you to a specialist or for a test outside of [address] [place] [address].  Your physician or the Difficulty urinating or defecating  11. bleeding     Do not drive when taking pain medications  Do not exceed 4 grams acetaminophen within 24 hours       Angelic Pastrana  4/3/2019  8:29 PM

## 2019-04-04 NOTE — PROGRESS NOTES
Izzy Pedroza  : 10/10/1965  Age 48year old  male patient is admitted to Facility: The [address] [place] [address] for Rehabilitation and Medical Management.     [address] [place] [address] Drive date:  3/18/19  Discharge date to [facility]: 19  ELOS:  11-14 days  Ant primary    • Mononucleosis 2010   • Muscle weakness    • Personal history of antineoplastic chemotherapy    • Pneumonia due to organism    • Pulmonary embolism (HCC)    • Visual impairment     glasses at all times     Past Surgical History:   Procedure Lat Rfl:    Ondansetron HCl (ZOFRAN) 8 MG tablet Take 1 tablet (8 mg total) by mouth every 8 (eight) hours as needed for Nausea. Disp: 20 tablet Rfl: 3   PARoxetine HCl 20 MG Oral Tab Take 20 mg by mouth every evening.    Disp:  Rfl:    dexamethasone (DECADRON click, no murmur, +tachycardic  ABDOMEN:  normal active BS+, soft, nondistended; no organomegaly, no masses; no bruits; nontender, no guarding, no rebound tenderness.   :Deferred  LYMPHATIC:no lymphedema  MUSCULOSKELETAL: no acute synovitis upper or lower 4/15/19    Encephalopathy  -Psych consult  -SLP evaluation-cognition  -Clonazepam 1 mg qhs, and 0.5 mg qam  -Seraquel 12.5 mg qhs  -Paxil 20 mg qhs    H/o PEs  -Xarelto 20 mg qd    Elevated heart rate  -had in hospital sinus tach  -monitor    [facility]

## 2019-04-07 PROBLEM — R53.1 GENERALIZED WEAKNESS: Status: ACTIVE | Noted: 2019-01-01

## 2019-04-07 PROBLEM — R53.81 PHYSICAL DECONDITIONING: Status: ACTIVE | Noted: 2019-01-01

## 2019-04-07 NOTE — PROGRESS NOTES
[facility] Author: Bonnie Castillo MD     10/10/1965 MRN [id number]   [facility]  Admission 4/3/19      Last Hospital Discharge 4/3/19 [facility] of Discharge 4/3/19       Date of Admission: 4/3/19 ideation and agitation   clonazePAM 0.5 MG Oral Tab Take 1 tablet (0.5 mg total) by mouth every morning. clonazePAM 1 MG Oral Tab Take 1 tablet (1 mg total) by mouth nightly.    predniSONE 5 MG Oral Tab Take 1 tablet (5 mg total) by mouth 2 (two) times da kg/m² as calculated from the following:    Height as of 3/18/19: 72\". Weight as of 4/3/19: 206 lb 2.1 oz. Physical Exam   Constitutional: He appears cachectic. Non-toxic appearance. No distress. HENT:   Head: Normocephalic and atraumatic.    No 3/10/2019, 21:04. EDWARD , XR CHEST PA + LAT CHEST (CPT=71046), 1/31/2019, 17:34.  TECHNIQUE:  PA and lateral chest radiographs were obtained. PATIENT STATED HISTORY: (As transcribed by Technologist)   Hypoxia.     FINDINGS:  LUNGS:  There is no pneumotho No sign of acute sinusitis. MASTOIDS:          No sign of acute inflammation. SKULL:             No depressed calvarial fracture. .      CONCLUSION:  Mild cerebral and cerebellar atrophy with chronic microvascular ischemic changes.   No acute intracranial h MD            Xr Video Swallow (dfi=[id number])    PROCEDURE:  XR VIDEO SWALLOW (CPT=74230)  TECHNIQUE:  Video fluoroscopic swallowing study was performed in cooperation with the speech pathologist.  Barium of varying consistencies was administered orally with unremarkable. PANCREAS:  No lesion, fluid collection, ductal dilatation, or atrophy. SPLEEN:  Splenomegaly is noted. KIDNEYS:  No mass or obstruction. ADRENALS:  No mass or enlargement. AORTA/VASCULAR:  No aneurysm or dissection.   RETROPERITONEUM:  No m fluid shows no evidence of increased attenuation or gas collection. There is bladder wall thickening. No small bowel obstruction. Multiple diverticular changes are seen throughout the colon most notably within the descending and sigmoid colon.   There i , XR CHEST PA + LAT CHEST (CPT=71046), 1/31/2019, 17:34. INDICATIONS:  fatigue, low O2 sat, recent pneumonia, liver cancer dx  PATIENT STATED HISTORY: (As transcribed by Technologist)  Patient provided no additional history at this time.     FINDINGS:  The following services:  Occupational Therapy  Physical Therapy  Respiratory Therapy  psych    -goal is to start chemo once he's completed 7-10 days of DEBORAH and doing well with no complications was discussed with wife.      I anticipate that he will need 2 weeks

## 2019-04-08 NOTE — PROGRESS NOTES
Stephanie Gee, 10/10/1965, 48year old, male    Chief Complaint:  Patient presents with:   Follow - Up: metastatic gastric cancer, weakness, liver mets  Weakness  Lab Results       Subjective:    47 y/o male with PMHx metastatic gastric cancer with extensi ---deferred  RESPIRATORY:---diminished bilaterally, no crackles, no wheezes, 2L/NC  CARDIOVASCULAR: S1, S2 normal, tachycardic 100s, no S3, no S4; , no click, no murmur  ABDOMEN:  normal active BS+, soft, nondistended; no organomegaly, no masses; non tende policy  -Fall precautions  -Bed alarm  -bed at lowest level  -Asp.  Precautions  -Bleeding precautions  -1:1 sitter at all times  -ELOS:  11-14 days  -Anticipated discharge date of 4/15/19     Encephalopathy, liver mets, elevated ammonia level  -Psych consu

## 2019-04-10 PROBLEM — E83.52 HYPERCALCEMIA: Status: ACTIVE | Noted: 2019-01-01

## 2019-04-10 PROBLEM — D72.829 LEUKOCYTOSIS, UNSPECIFIED TYPE: Status: ACTIVE | Noted: 2019-01-01

## 2019-04-10 PROBLEM — E87.0 HYPERNATREMIA: Status: ACTIVE | Noted: 2019-01-01

## 2019-04-10 NOTE — ED NOTES
Pt brief changed, extra linens removed from underneath pt, pt appears to be resting comfortably, wife at bedside.

## 2019-04-10 NOTE — ED INITIAL ASSESSMENT (HPI)
[facility] [employer], pt hx of gastric and liver CA with mets.  [facility] has elevated [facility], they have been giving him 0.9 NaCL for the last couple days through pt port however pt has had increased we

## 2019-04-10 NOTE — PROGRESS NOTES
Yolanda Mixon, 10/10/1965, 48year old, male    Chief Complaint:  Patient presents with:   Follow - Up: gastric cancer with mets, liver, hepatic encephalopathy, lethargy  Lab Results  Weakness       Subjective:    47 y/o male with PMHx metastatic gastric c none  EYES: PERRLA, EOMI, sclera icteric, conjunctiva normal; there is no nystagmus, no drainage from eyes  HENT: normocephalic; normal nose, no nasal drainage, mucous membranes pink, moist.  NECK: supple; FROM;   BREAST: --- deferred  RESPIRATORY:---dimin cancer/Deconditioned/Fall  -PT/OT to evaluate and treat  -Palliative Care Consult  -Tylenol 500 mg q6h prn for fever/pain, notify MD/NP if given for fever  -Prednisone 5 mg bid  --Zofran 8 mg   -Compazine 5 mg q6h prn  -Morphine 10 mg/5ml q3h  -Neuro check weakness. JAMIN RN and will call for ambulance and ED care manager with update.     Jann Romo, APRN  4/10/2019

## 2019-04-10 NOTE — ED PROVIDER NOTES
Patient Seen in: [facility] Emergency Department    History   Patient presents with:  Abnormal Labs  Fatigue (constitutional, neurologic)    Stated Complaint: increased weakness, abnormal labs    HPI    68-year-old male presents emergency department above.    Physical Exam     ED Triage Vitals [04/10/19 1649]   BP 99/69   Pulse 114   Resp 26   Temp 98.9 °F (37.2 °C)   Temp src Temporal   SpO2 97 %   O2 Device Nasal cannula       Current:BP 94/69   Pulse 109   Temp 98.9 °F (37.2 °C) (Temporal)   Resp 2 MCHC 27.0 (*)     RDW 27.0 (*)     RDW-SD 82.9 (*)     Neutrophil Absolute Prelim 23.24 (*)     Neutrophil Absolute 23.24 (*)     Monocyte Absolute 1.70 (*)     All other components within normal limits   LIPASE - Normal   CBC WITH DIFFERENTIAL WITH PLATEL Prescribed:  Current Discharge Medication List        Present on Admission  Date Reviewed: 1/19/2019          ICD-10-CM Noted POA    Hypernatremia E87.0 4/10/2019 Unknown

## 2019-04-11 NOTE — DIETARY MALNUTRITION NOTE
[facility]    NUTRITION INITIAL ASSESSMENT    Pt meets severe malnutrition criteria.     CRITERIA FOR MALNUTRITION DIAGNOSIS:  Criteria for severe malnutrition diagnosis: acute illness/injury related to wt loss greater than 2% in 1 week, energy intake kg (206 lb 3.2 oz)  03/06/19 : 96.6 kg (213 lb)  02/26/19 : 96.7 kg (213 lb 3.2 oz)  02/19/19 : 99.1 kg (218 lb 8 oz)      NUTRITION:  Diet: general, pureed  Oral Supplements: enlive    FOOD/NUTRITION RELATED HISTORY:  Appetite: Poor  Intake: <50%  Intake

## 2019-04-11 NOTE — CONSULTS
[facility]  Report of Consultation    Jahaira Blackwood Patient Status:  Inpatient    10/10/1965 MRN [id number]   [facility] Attending Nma Morgan MD   Hosp Day # 1 PCP Sánchez Dennis MD     REASON FOR CONSULTATION:     Gastric 2 mg, Intravenous, Q2H PRN **OR** morphINE sulfate (PF) 4 MG/ML injection 4 mg, 4 mg, Intravenous, Q2H PRN    Review of Systems:  See HPI    Physical Exam:   Blood pressure 120/78, pulse 116, temperature 98.6 °F (37 °C), temperature source Axillary, resp. Bilirubin, Total 4.6 (H) 0.1 - 2.0 mg/dL    Total Protein 6.3 (L) 6.4 - 8.2 g/dL    Albumin 1.6 (L) 3.4 - 5.0 g/dL    Globulin  4.7 (H) 2.8 - 4.4 g/dL    A/G Ratio 0.3 (L) 1.0 - 2.0   AMMONIA, PLASMA    Collection Time: 04/10/19  1:00 PM   Result Value Ref 27.0 (H) 11.0 - 15.0 %    RDW-SD 82.9 (H) 35.1 - 46.3 fL    Neutrophil Absolute Prelim 23.24 (H) 1.50 - 7.70 x10 (3) uL    Neutrophil Absolute 23.24 (H) 1.50 - 7.70 x10(3) uL    Lymphocyte Absolute 2.66 1.00 - 4.00 x10(3) uL    Monocyte Absolute 1.70 (H) 0 mg/dL    Total Protein 6.2 (L) 6.4 - 8.2 g/dL    Albumin 1.6 (L) 3.4 - 5.0 g/dL    Globulin  4.6 (H) 2.8 - 4.4 g/dL    A/G Ratio 0.3 (L) 1.0 - 2.0   MAGNESIUM    Collection Time: 04/11/19  5:59 AM   Result Value Ref Range    Magnesium 2.8 (H) 1.6 - 2.6 mg/ 570 (H) 45 - 117 U/L    Bilirubin, Total 5.4 (H) 0.1 - 2.0 mg/dL    Total Protein 6.2 (L) 6.4 - 8.2 g/dL    Albumin 1.6 (L) 3.4 - 5.0 g/dL    Globulin  4.6 (H) 2.8 - 4.4 g/dL    A/G Ratio 0.3 (L) 1.0 - 2.0       [facility] Labs   Lab 04/08/19  0931 04/10/19  1 Hyperglycemia     Community acquired pneumonia of right lung     Community acquired pneumonia of right lung, unspecified part of lung     Malignant neoplasm of cardia of stomach ([address]     Dehydration     Sepsis due to pneumonia ([facility] [address]     Altered mental sta

## 2019-04-11 NOTE — H&P
[facility] Hospitalist History and Physical      CC: Weakness    PCP: Doris Valente MD    History of Present Illness: Patient is a 48year old male with PMH sig for met gastric CA here due to lethargy, poor PO.  He was recently admitted to [facility] 3/18- 4/2 for fev 30 tablet Rfl: 0   morphINE Sulfate 10 MG/5ML Oral Solution Take 1.3-2.5 mL (2.6-5 mg total) by mouth every 3 (three) hours as needed for Pain. Disp: 100 mL Rfl: 0   Rivaroxaban (XARELTO) 20 MG Oral Tab Take 20 mg by mouth daily with food.  Disp:  Rfl:    a 04/11/2019    CREATSERUM 1.55 04/11/2019    BUN 52 04/11/2019     04/11/2019    K 3.8 04/11/2019     04/11/2019    CO2 24.0 04/11/2019     04/11/2019    CA 12.8 04/11/2019    ALB 1.6 04/11/2019    ALKPHO 570 04/11/2019    BILT 5.4 04/11/ house.    Hospice consult. SW consult. POLST form to be filled out today. DNR. Focus on comfort. Does not wish for any heroic measures of significant escalation of care. Discussed with wife at bedside.     Jarek Samuel MD  [facility] Hospitalist  Pager:

## 2019-04-11 NOTE — PROGRESS NOTES
2nd bolus of ivf completed. Labs repeated-calcium 14.0 and sodium 164. Results reported to MD.  IVF changed to .45ns and recheck labs in am.  Pt remains minimally responsive, moves extremities spontaneously, respirations unlabored, vital signs stable.

## 2019-04-11 NOTE — PLAN OF CARE
Problem: METABOLIC/FLUID AND ELECTROLYTES - ADULT  Goal: Electrolytes maintained within normal limits  Description  INTERVENTIONS:  - Monitor labs and rhythm and assess patient for signs and symptoms of electrolyte imbalances  - Administer electrolyte re

## 2019-04-11 NOTE — ED NOTES
First liter of fluid almost complete, pt transporting to floor. Floor RN to give second bolus of fluids.

## 2019-04-11 NOTE — PAYOR COMM NOTE
--------------  ADMISSION REVIEW     Payor: [facility]  Subscriber #:  [id number]  Authorization Number: [id number]    Admit date: 4/10/19  Admit time: 2103       Admitting Physician: Harmeet Kulkarni MD  Attending Physician:  Nam Morgan MD  Primary (*)     Bilirubin, Total 4.8 (*)     Total Protein 6.3 (*)     Albumin 1.7 (*)     Globulin  4.6 (*)     A/G Ratio 0.4 (*)     All other components within normal limits   AMMONIA, PLASMA - Abnormal; Notable for the following components:    Ammonia <10 (*) metabolic derangmenets including hypernatremia, hypercalemia, ABDI.      Assessment/Plan  49 yo with widely met gastric CA was not able to undergo chemotherapy due to poor PS admitted with lethargy     # AMS, lethargy  - Suspect 2/2 severe metabolic derangem fever history  Likely tumor related     Spoke with wife this am  This is advanced stage cancer of the stomach and he has declined significantly as a result of progressive cancer. There is no plan for active chemotherapy.  Wife understand that he is dying an

## 2019-04-11 NOTE — PHYSICAL THERAPY NOTE
PT order received via admission protocol, chart reviewed. Pt not following commands at this time, hospice consult placed. D/w RN, will f/u to see later during admit if appropriate.

## 2019-04-11 NOTE — CONSULTS
[facility]    Report of Consultation    Maame Newsomever Patient Status:  Inpatient    10/10/1965 MRN [id number]   [facility] 4NW-A Attending Jeanine Mckeon MD   Hosp Day # 1 PCP Eric Hill MD       REASON FOR CONSULT:     ABDI, chon OTHER SURGICAL HISTORY      R thumb partial amputation   • OTHER SURGICAL HISTORY      L index partial amputation   • PORT, INDWELLING, IMP       Family History   Problem Relation Age of Onset   • Hypertension Father    • Diabetes Sister       reports that clammy skin  HEENT - temporal wasting  CV - tachycardic, reg  Resp - CTAB  Abd -soft  Ext - warm, no edema  MS - sleepy    LABORATORY DATA:       Lab Results   Component Value Date     (H) 04/11/2019    BUN 52 (H) 04/11/2019    BUNCREA 33.5 (H) 04/1 PROUR Negative 04/03/2019    UROBILINOGEN 1.0 04/03/2019    NITRITE Negative 04/03/2019    LEUUR Negative 04/03/2019    NMIC Microscopic not indicated 04/03/2019    WBCUR None Seen 03/18/2019    RBCUR None Seen 03/18/2019    EPIUR None Seen 03/18/2019    B

## 2019-04-11 NOTE — PROGRESS NOTES
NURSING ADMISSION NOTE      Patient admitted via Cart from ER.  Lethargic,   Not verbally responsive but open eyes to painful  Stimulation , wife was at the bedside, stated that  From the last 3 days patient is getting more lethargic  And weaker, been g

## 2019-04-11 NOTE — CM/SW NOTE
MDO received to set up family meeting with hospice. Fartun Sheehan spoke with wife who is requesting [facility]. Harry Kowalski spouse requesting meeting for Friday when her son will be home from college. Referral sent via [facility].     Serenity Nair MSN RN, CTL/Case Manage

## 2019-04-12 NOTE — PLAN OF CARE
Problem: GASTROINTESTINAL - ADULT  Goal: Maintains adequate nutritional intake (undernourished)  Description  INTERVENTIONS:  - Monitor percentage of each meal consumed  - Identify factors contributing to decreased intake, treat as appropriate  - Assist status  Description  INTERVENTIONS  - Assess for and report changes in neurological status  - Initiate measures to prevent increased intracranial pressure  - Maintain blood pressure and fluid volume within ordered parameters to optimize cerebral perfusion

## 2019-04-12 NOTE — CM/SW NOTE
Received a call from [facility] who states they reached out to wife. Wife declines meeting at this time but she will call them if she want to schedule a meeting.     Montez Ghotra MSN RN, CTL/  P: [phone]

## 2019-04-12 NOTE — PROGRESS NOTES
04/12/19 1726   Clinical Encounter Type   Visited With Patient and family together;Health care provider   Routine Visit Introduction   Continue Visiting No  (cinda request or as needed)   Sacramental Encounters   Sacrament of Sick-Anointing Family request

## 2019-04-12 NOTE — PROGRESS NOTES
[facility]  Progress Note    Yolanda Filley Patient Status:  Inpatient    10/10/1965 MRN [id number]   [facility] 4NW-A Attending Woody Palomino MD   Hosp Day # 2 PCP Alix Lopez MD     Subjective:    Sleeping    Objective:  Blood mg/dL    Sodium 164 (HH) 136 - 145 mmol/L    Potassium 3.8 3.5 - 5.1 mmol/L    Chloride 131 (H) 98 - 112 mmol/L    CO2 27.0 21.0 - 32.0 mmol/L    Anion Gap 6 0 - 18 mmol/L    BUN 62 (H) 7 - 18 mg/dL    Creatinine 1.79 (H) 0.70 - 1.30 mg/dL    BUN/CREA Rati BUN/CREA Ratio 38.8 (H) 10.0 - 20.0    Calcium, Total 12.2 (H) 8.5 - 10.1 mg/dL    Calculated Osmolality 359 (H) 275 - 295 mOsm/kg    GFR, Non- 46 (L) >=60    GFR, -American 53 (L) >=60    Albumin 1.5 (L) 3.4 - 5.0 g/dL    Phospho

## 2019-04-12 NOTE — CM/SW NOTE
Family now declining to meet w/[facility] at this time. Wife stating she just wanted to spend time w/her  before he passes. Pt on comfort care. RN to let SW know if the pt's wife changes her mind about wanting to meet w/[facility].

## 2019-04-12 NOTE — PROGRESS NOTES
[facility]    Nephrology Progress Note    Jahaira Blackwood Attending:  Nam Morgan MD       SUBJECTIVE:     Sleepy  Not answering questions  Na level remains very elevated despite increasing rates of 0.45 and switching to D5W  Calcium level also 04/03/2019    BASABS 0.00 04/03/2019    NEUT 89 04/03/2019    LYMPH 3 04/03/2019    MON 8 04/03/2019    EOS 0 04/03/2019    BASO 0 04/03/2019    NEPERCENT 83.3 04/11/2019    LYPERCENT 10.1 04/11/2019    MOPERCENT 5.4 04/11/2019    EOPERCENT 0.0 04/11/2019 history of metastatic gastric cancer with pulm/hepatic/retroperitoneal mets, prior PE, anxiety, anemia, FOUZIA recent hospitalization with fevers (possibly related to tumor, ?pna) presented with low grade fevers, AMS.  Found to have ABDI ([facility]

## 2019-04-12 NOTE — PLAN OF CARE
Pt remains lethargic. No oral intake. BMP results relayed to Dr. Fredy Beebe overnight with ivf orders received and carried out. Pt incontinent with soaked diapers. No changes in respiratory noted breathing remains unlabored on 2 LNC. Iv abx given.  Pt with bee

## 2019-04-13 NOTE — PLAN OF CARE
Problem: SAFETY ADULT - FALL  Goal: Free from fall injury  Description  INTERVENTIONS:  - Assess pt frequently for physical needs  - Identify cognitive and physical deficits and behaviors that affect risk of falls.   - [place] fall precautions as indica stability  Description  INTERVENTIONS  - Assess for signs and symptoms of bleeding or hemorrhage  - Monitor labs and vital signs for trends  - Administer supportive blood products/factors, fluids and medications as ordered and appropriate  - Administer sup

## 2019-04-13 NOTE — PROGRESS NOTES
[facility]  Progress Note    Donandrei Medico Patient Status:  Inpatient    10/10/1965 MRN [id number]   [facility] 4NW-A Attending Trey Campuzano MD   Hosp Day # 3 PCP Bruno Roth MD     Subjective:    Sleeping  Wife at bedside Gap 4 0 - 18 mmol/L    BUN 51 (H) 7 - 18 mg/dL    Creatinine 1.52 (H) 0.70 - 1.30 mg/dL    BUN/CREA Ratio 33.6 (H) 10.0 - 20.0    Calcium, Total 10.8 (H) 8.5 - 10.1 mg/dL    Calculated Osmolality 352 (H) 275 - 295 mOsm/kg    GFR, Non-African American 52 (L has poor overall health. Not eating and loosing weight  PS is 4  LFTs are abnormal with elevated bilirubin    2.  Hypercalcemia - of malignancy   Calcium has decreased since admission with IVF and calcitonin  S/p pamidronate -      3. Leukocytosis and fev

## 2019-04-13 NOTE — PROGRESS NOTES
[facility]    Nephrology Progress Note    Steven Knapp Attending:  Martha Rea MD       SUBJECTIVE:     Sleepy / lethargic, opened eyes briefly when name called   Not answering questions      PHYSICAL EXAM:     Vital Signs: /72 (BP Loc Component Value Date    WBC 28.1 (H) 04/11/2019    RBC 3.74 (L) 04/11/2019    HGB 8.7 (L) 04/11/2019    HCT 34.5 (L) 04/11/2019    .0 04/11/2019    MCV 92.2 04/11/2019    MCH 23.3 (L) 04/11/2019    MCHC 25.2 (L) 04/11/2019    RDW 26.9 (H) 04/11/20 mg 20 mg Oral QPM   rivaroxaban (XARELTO) tab 20 mg 20 mg Oral Daily with food   acetaminophen (TYLENOL) tab 650 mg 650 mg Oral Q6H PRN   morphINE sulfate (PF) 4 MG/ML injection 1 mg 1 mg Intravenous Q2H PRN   Or      morphINE sulfate (PF) 4 MG/ML injectio

## 2019-04-14 NOTE — PLAN OF CARE
Lethargic. Opens eyes. Minimal speech. 2LNC. Unable to take po due to lethargy. Unsuccessful [place] cath. Brief in place. Incontinent. Repositioned in bed per staff. Pain meds given as needed. Family at bedside. Bed alarm on.  K phos rider given per nephrolo adequate nutritional intake (undernourished)  Description  INTERVENTIONS:  - Monitor percentage of each meal consumed  - Identify factors contributing to decreased intake, treat as appropriate  - Assist with meals as needed  - Monitor I&O, WT and lab value

## 2019-04-14 NOTE — PROGRESS NOTES
[facility] Hospitalist Progress Note                                                                   [address] [facility]  10/10/1965    CC: FU    Interval History:  - About the same today    Current M noted in the Interval History above.     Assessment/Plan:    47 yo with widely met gastric CA was not able to undergo chemotherapy due to poor PS admitted with lethargy     # AMS, lethargy  - Suspect 2/2 severe metabolic derangement   - See below  - Possibl

## 2019-04-14 NOTE — PLAN OF CARE
Problem: SAFETY ADULT - FALL  Goal: Free from fall injury  Description  INTERVENTIONS:  - Assess pt frequently for physical needs  - Identify cognitive and physical deficits and behaviors that affect risk of falls.   - [place] fall precautions as indica blood pressure and fluid volume within ordered parameters to optimize cerebral perfusion and minimize risk of hemorrhage  - Monitor temperature, glucose, and sodium.  Initiate appropriate interventions as ordered  Outcome: Not [employer] car

## 2019-04-15 NOTE — CM/SW NOTE
Wife is not wanting to sign consents at this time. She wants to speak w/the MD to see how long they could potentially extend the pt's life. Wife is wanting to wait until her son return from [place]. [facility] to follow up w/pt's wife in a day or two.

## 2019-04-15 NOTE — PROGRESS NOTES
[facility] Hospitalist Progress Note                                                                   [address] [facility]  10/10/1965    CC: FU    Interval History:  - Not responding to me today  - No lethargy  - Suspect 2/2 severe metabolic derangement though MS not improved with correction of such on admit  - See below  - Possible infection vs tumor fevers- cultures are negative- and no sig change in MS with abx- I see no indication to continue empiri I don't know how he could be cared for at home in current state. Updated Oncology- they will talk to wife. DNR. Focus on comfort. Does not wish for any heroic measures of significant escalation of care. Life expectancy currently days not weeks.     K

## 2019-04-15 NOTE — PROGRESS NOTES
[facility]    Nephrology Progress Note    Mook Listen Attending:  Chito Salazar MD       SUBJECTIVE:     Lethargic in bed   Not answering questions for me    PHYSICAL EXAM:     Vital Signs: BP 98/58 (BP Location: Right arm)   Pulse 108   Temp 04/03/2019    BASABS 0.00 04/03/2019    NEUT 89 04/03/2019    LYMPH 3 04/03/2019    MON 8 04/03/2019    EOS 0 04/03/2019    BASO 0 04/03/2019    NEPERCENT 83.3 04/11/2019    LYPERCENT 10.1 04/11/2019    MOPERCENT 5.4 04/11/2019    EOPERCENT 0.0 04/11/2019 49 yo M with history of metastatic gastric cancer with pulm/hepatic/retroperitoneal mets, prior PE, anxiety, anemia, FOUZAI recent hospitalization with fevers (possibly related to tumor, ?pna) presented with low grade fevers, AMS. Found to have ABDI (Cr 0.

## 2019-04-15 NOTE — PLAN OF CARE
Problem: GENITOURINARY - ADULT  Goal: Absence of urinary retention  Description  INTERVENTIONS:  - Assess patient?s ability to void and empty bladder  - Monitor intake/output and perform bladder scan as needed  - Follow urinary retention protocol/standar oxygenation and minimize respiratory effort  - Oxygen supplementation based on oxygen saturation or ABGs  - Provide Smoking Cessation handout, if applicable  - Encourage broncho-pulmonary hygiene including cough, deep breathe, Incentive Spirometry  - Asses

## 2019-04-15 NOTE — CM/SW NOTE
Received a call from [facility] stating they have a meeting with patient's spouse at [place] [address] today per wife request. Blaise Victor will remain available to assist as needed.     Wilbur Infante MSN RN, [facility]/  P: [phone]

## 2019-04-15 NOTE — PROGRESS NOTES
04/15/19 1440   Clinical Encounter Type   Visited With Patient and family together   Patient's Supportive Strategies/Resources Father Tamie Montenegro provided prayer, Scripture, support and Sacrament of the Sick on 4/13/2019.    Sacramental Encounters   Sacrament of

## 2019-04-15 NOTE — PLAN OF CARE
Problem: SAFETY ADULT - FALL  Goal: Free from fall injury  Description  INTERVENTIONS:  - Assess pt frequently for physical needs  - Identify cognitive and physical deficits and behaviors that affect risk of falls.   - [place] fall precautions as indica Optimize oral hygiene and moisture  - Encourage food from home; allow for food preferences  - Enhance eating environment  Outcome: Not Progressing     Problem: GENITOURINARY - ADULT  Goal: Absence of urinary retention  Description  INTERVENTIONS:  - Assess and moisture  - Encourage food from home; allow for food preferences  - Enhance eating environment  Outcome: Not Progressing     Problem: GENITOURINARY - ADULT  Goal: Absence of urinary retention  Description  INTERVENTIONS:  - Assess patient?s ability to

## 2019-04-16 NOTE — PROGRESS NOTES
[facility]    Nephrology Progress Note    Mychal  Attending:  Curtis Rebolledo MD       SUBJECTIVE:     Lethargic in bed   Not answering questions for me    PHYSICAL EXAM:     Vital Signs: BP (!) 85/45 (BP Location: Right arm)   Pulse 103 Component Value Date    WBC 28.1 (H) 04/11/2019    RBC 3.74 (L) 04/11/2019    HGB 8.7 (L) 04/11/2019    HCT 34.5 (L) 04/11/2019    .0 04/11/2019    MCV 92.2 04/11/2019    MCH 23.3 (L) 04/11/2019    MCHC 25.2 (L) 04/11/2019    RDW 26.9 (H) 04/11/20 Heparin Lock Flush 100 UNIT/ML lock flush 500 Units 5 mL Intravenous PRN   Pantoprazole Sodium (PROTONIX) 40 mg in Sodium Chloride 0.9 % 10 mL IV push 40 mg Intravenous Q24H   PARoxetine HCl (PAXIL) tab 20 mg 20 mg Oral QPM   rivaroxaban (XARELTO) tab 20

## 2019-04-16 NOTE — PLAN OF CARE
Problem: SAFETY ADULT - FALL  Goal: Free from fall injury  Description  INTERVENTIONS:  - Assess pt frequently for physical needs  - Identify cognitive and physical deficits and behaviors that affect risk of falls.   - [place] fall precautions as indica effects  - Notify MD/LIP if interventions unsuccessful or patient reports new pain  - Anticipate increased pain with activity and pre-medicate as appropriate  Outcome: Progressing     Problem: NEUROLOGICAL - ADULT  Goal: Achieves stable or improved neurolo

## 2019-04-16 NOTE — PROGRESS NOTES
[facility] Hospitalist Progress Note                                                                   [address] [facility]  10/10/1965    CC: FU    Interval History:  Doing better than yesterday.  Less t yesterday, except as otherwise noted in the Interval History above.     Assessment/Plan:    49 yo with widely met gastric CA was not able to undergo chemotherapy due to poor PS admitted with lethargy     # AMS, lethargy  - Suspect 2/2 severe metabolic deran

## 2019-04-16 NOTE — PLAN OF CARE
Problem: GENITOURINARY - ADULT  Goal: Absence of urinary retention  Description  INTERVENTIONS:  - Assess patient?s ability to void and empty bladder  - Monitor intake/output and perform bladder scan as needed  - Follow urinary retention protocol/standar Encourage broncho-pulmonary hygiene including cough, deep breathe, Incentive Spirometry  - Assess the need for suctioning and perform as needed  - Assess and instruct to report SOB or any respiratory difficulty  - Respiratory Therapy support as indicated

## 2019-04-16 NOTE — PROGRESS NOTES
[facility]  Progress Note    Tio Bautista Patient Status:  Inpatient    10/10/1965 MRN [id number]   [facility] 4NW-A Attending Mamie Burk MD   Hosp Day # 6 PCP Doris Valente MD     Subjective:  Mr. Kristie Koehler is resting comfortably Collection Time: 04/16/19  6:10 AM   Result Value Ref Range    Glucose 146 (H) 70 - 99 mg/dL    Sodium 149 (H) 136 - 145 mmol/L    Potassium 3.8 3.5 - 5.1 mmol/L    Chloride 117 (H) 98 - 112 mmol/L    CO2 20.0 (L) 21.0 - 32.0 mmol/L    Anion Gap 12 0 - 18 understanding.        Thank you for allowing me to participate in the care of your patient.     Stephanie Stuart

## 2019-04-17 NOTE — PROGRESS NOTES
04/17/19 1050   BiPAP   $ RT Standby Charge (per 15 min) 1   Device V60   BiPAP / CPAP CE# 6080   Mode Spontaneous/Timed   Interface Full face mask   Mask Size Medium   Control Settings   Set Rate 12 breaths/min   Set IPAP 12   Set EPAP 6   Oxygen Perce

## 2019-04-17 NOTE — PROGRESS NOTES
[facility]  Progress Note    Emile Millard Patient Status:  Inpatient    10/10/1965 MRN [id number]   [facility] 4NW-A Attending Germania Fournier MD   Hosp Day # 7 PCP Sheeba Carter MD     Subjective:  Mr. Kimmy Vega is resting comfortably Sample Site Right Radial     ABG Device Bi-PAP     ABG Vent Mode Spontaneous/Timed     FIO2 100 %    Vent Rate 12 /min    Inspiratory Time 12 %    Inspiratory Pressure 6.0 cm M9B   BASIC METABOLIC PANEL (8)    Collection Time: 04/16/19 10:53 PM   Result Va has been evident  PS is 4 and he is currently asleep after pain medication administration earlier today. LFTs are abnormal with elevated bilirubin     Spoke with wife again today and I have reviewed the case with Dr. Giovanni Leal.   This is advanced stage cancer

## 2019-04-17 NOTE — PROGRESS NOTES
04/17/19 4041   Clinical Encounter Type   Visited With Patient and family together  (Mom and aunt at bedside.)   Routine Visit Follow-up   Continue Visiting No   Patient's Supportive Strategies/Resources  provided emotional support, including ac

## 2019-04-17 NOTE — PLAN OF CARE
Patient  on bipap with o2 saturation at 93percant, bp stable 109/55  Patient tachypenic rate 30- 34  Patient lethargic, and jaundice.   Problem: RESPIRATORY - ADULT  Goal: Achieves optimal ventilation and oxygenation  Description  INTERVENTIONS:  - Assess f nutritional consult as needed  - Optimize oral hygiene and moisture  - Encourage food from home; allow for food preferences  - Enhance eating environment  Outcome: Not Progressing     Problem: GASTROINTESTINAL - ADULT  Goal: Maintains adequate nutritional

## 2019-04-17 NOTE — PROGRESS NOTES
[facility]    Nephrology Progress Note    Fady Kramer Attending:  Maria Eugenia Douglas MD       SUBJECTIVE:     Lethargic in bed   On Bipap today     PHYSICAL EXAM:     Vital Signs: BP 92/55 (BP Location: Right arm)   Pulse 108   Temp 98.5 °F (36.9 04/03/2019    BASABS 0.00 04/03/2019    NEUT 89 04/03/2019    LYMPH 3 04/03/2019    MON 8 04/03/2019    EOS 0 04/03/2019    BASO 0 04/03/2019    NEPERCENT 83.3 04/11/2019    LYPERCENT 10.1 04/11/2019    MOPERCENT 5.4 04/11/2019    EOPERCENT 0.0 04/11/2019 history of metastatic gastric cancer with pulm/hepatic/retroperitoneal mets, prior PE, anxiety, anemia, FOUZIA recent hospitalization with fevers (possibly related to tumor, ?pna) presented with low grade fevers, AMS.  [facility]

## 2019-04-17 NOTE — PLAN OF CARE
Pt desaturated to 83% on 4 LNC tachypneic 30-32 BPM with accessory muscle uses. Oxygen increased to 10 LHF to keep oxygen saturation above 90. RT called. Spoke to Dr. Finn Gama updated pt's status with orders received. Spoke to pt's wife Aston Neff).  She agr

## 2019-04-17 NOTE — PROGRESS NOTES
Called to patients room due to pt desaturating on 10L HFNC. ABG drawn. Pt placed on bipap 12/6 100%. Oxygen saturations improved after bipap. FiO2 weaned to 80%. Will continue to monitor.      04/17/19 0340   BiPAP   $ RT Standby Charge (per 15 min) 1  (bip

## 2019-04-17 NOTE — PROGRESS NOTES
[facility] Hospitalist Progress Note                                                                   [address] [facility]  10/10/1965    CC: FU    Interval History:  More tachypneic and hypoxic overnig not able to undergo chemotherapy due to poor PS admitted with lethargy     # AMS, lethargy  - Suspect 2/2 severe metabolic derangement though MS not improved with correction of such on admit  - Has recurrent fever which has been the case for some time- see

## 2019-04-18 PROBLEM — Z71.89 COUNSELING REGARDING ADVANCE CARE PLANNING AND GOALS OF CARE: Status: ACTIVE | Noted: 2019-01-01

## 2019-04-18 PROBLEM — Z51.5 END OF LIFE CARE: Status: ACTIVE | Noted: 2019-01-01

## 2019-04-18 PROBLEM — R06.03 ACUTE RESPIRATORY DISTRESS: Status: ACTIVE | Noted: 2019-01-01

## 2019-04-18 NOTE — PROGRESS NOTES
[facility]    Nephrology Progress Note    Brianna Red Attending:  Myra Torres MD       SUBJECTIVE:     Lethargic in bed   On Bipap today     PHYSICAL EXAM:     Vital Signs: /69 (BP Location: Right arm)   Pulse 114   Temp 98.4 °F (36.9 BASABS 0.00 04/03/2019    NEUT 89 04/03/2019    LYMPH 3 04/03/2019    MON 8 04/03/2019    EOS 0 04/03/2019    BASO 0 04/03/2019    NEPERCENT 83.3 04/11/2019    LYPERCENT 10.1 04/11/2019    MOPERCENT 5.4 04/11/2019    EOPERCENT 0.0 04/11/2019    BAPERCENT 0 metastatic gastric cancer with pulm/hepatic/retroperitoneal mets, prior PE, anxiety, anemia, FOUZIA recent hospitalization with fevers (possibly related to tumor, ?pna) presented with low grade fevers, AMS.  Found to have ABDI (Cr 0.6 mg/dl at baseline --> 1.55

## 2019-04-18 NOTE — PROGRESS NOTES
04/18/19 0714   BiPAP   $ RT Standby Charge (per 15 min) 1   Device V60   BiPAP / CPAP CE# 6080   Mode Spontaneous/Timed   Interface Full face mask   Mask Size Medium   Control Settings   Set Rate 12 breaths/min   Set IPAP 14   Set EPAP 8   Oxygen Perce

## 2019-04-18 NOTE — PROGRESS NOTES
04/18/19 0325   BiPAP   $ RT Standby Charge (per 15 min) 1   $ FOUZIA Follow up charge Yes   Device V60   BiPAP / CPAP CE# 6072   Mode Spontaneous/Timed   Interface Full face mask   Control Settings   Set Rate 12 breaths/min   Set IPAP 14   Set EPAP 8   Ox

## 2019-04-18 NOTE — PAYOR COMM NOTE
--------------  CONTINUED STAY REVIEW    Payor: [facility] PPO  Subscriber #:  [id number]  Authorization Number: [id number]    Admit date: 4/10/19  Admit time: 2103    Admitting Physician: Rosita Grace MD  Attending Physician:  Tyrese Quinn MD  Primary Care chart. I also met with Mrs Briana Santos myself.        This is the 3rd hospital stay in the last 5 weeks.    Today is day 8 of hospitalization.        We talked about managing his symptoms in a way to avoid the peaks and troughs of prn Morphine dosing  I recommend

## 2019-04-18 NOTE — PROGRESS NOTES
Patient seen this morning. Discussed with Dr. Ary Schwartz. We agree that continuing current management does not feel to us to be in the patient's best interest. Patient most appropriate for hospice care.    Family has expressed desire to \"keep him alive\" until yesterday, except as otherwise noted in the Interval History above.     Assessment/Plan:    49 yo with widely met gastric CA was not able to undergo chemotherapy due to poor PS admitted with lethargy     # AMS, lethargy  - Suspect 2/2 severe metabolic deran note above re: plan to involve palliative, may need ethics consult. SW called today to discuss. Discussed with Dr. Lissett Altamirano who will see today. Discussed with Dr. Anny Oneill.     Jed Dunn MD  [facility]  Pager: [phone]

## 2019-04-18 NOTE — PROGRESS NOTES
[facility]  Progress Note    Jassi Zeng Patient Status:  Inpatient    10/10/1965 MRN [id number]   [facility] 4NW-A Attending Misty Stahl MD   Hosp Day # 8 PCP Shira Rosas MD     Subjective:  Mr. Kristopher Salamanca is resting comfortably bilirubin     Spoke with wife again today and I have reviewed the case with Dr. Lucie Ryan earlier this week. Case d/w Dr. Eugenio Díaz. We plan for Palliative care consultation today as to [address] 66 discussion and options.     This is advanced stage cancer of the

## 2019-04-18 NOTE — PLAN OF CARE
Pt. Drowsy and lethargic. Does moan and grimace with movement and pain. Pt. On Bipap overnight; O2 sats low 90's, with periods of sats at 88%. Respiratory called and came to see pt. Per Respiratory the Bipap machine is as high as it goes.  Pt. Noted to be m Consider cultural and social influences on pain and pain management  - Manage/alleviate anxiety  - Utilize distraction and/or relaxation techniques  - Monitor for opioid side effects  - Notify MD/LIP if interventions unsuccessful or patient reports new robert

## 2019-04-18 NOTE — CM/SW NOTE
MICHAEL called and spoke with wife about meeting to discuss hospice. She requested meeting for tomorrow at [address] Rosi Delgado to inform of meeting time.

## 2019-04-18 NOTE — PROGRESS NOTES
Assumed care at 0730;received with bipap inplaced;asleep;spouse at bedside;asked noc rn to give another morphine (remaining 2 mg) bec pt.  Still moaning;  1030- rounded followed by ;no new orders et time; told this nurse will

## 2019-04-18 NOTE — CONSULTS
[address] [facility] [address] [facility]  [id number]  Hospital Day #8  Date of Consult: 04/18/19  Patient seen at: [facility]    Reason for Consultation:      Consult requested by Dr Marty Queen, [facility] physic Procedure Laterality Date   • HERNIA SURGERY      LIH   • OTHER SURGICAL HISTORY      R thumb partial amputation   • OTHER SURGICAL HISTORY      L index partial amputation   • PORT, INDWELLING, IMP         Social History:      Marital Status:   Ch Date    CREATSERUM 1.57 (H) 04/18/2019    BUN 46 (H) 04/18/2019     (H) 04/18/2019    K 3.5 04/18/2019     (H) 04/18/2019    CO2 21.0 04/18/2019     (H) 04/18/2019    CA 9.1 04/18/2019    ALB 1.2 (L) 04/18/2019    ALKPHO 570 (H) 04/11/20 Normal Full   80 Full Some disease  Normal w/effort Full Normal or  Reduced Full   70 Reduced Some disease  Can’t perform job Full Normal or   Reduced Full   60 Reduced Significant disease  Can’t perform hobby Occasional  Assist Normal or   Reduced Full or stave this off    In the same breath, she realizes that her  is dying  She doesn't want to prolong his dying  She doesn't want him to suffer    In the same breath, however, she has periods of anger    I allowed her to vent her feelings and share her allowed to have a natural death without BIPAP machine and without IVF's. Moreover, she agreed to utilizing Morphine low IV drip. I have placed orders for titration depending on his signs and/or symptoms of any distress. SW consult for hospice GIP.  Moreover

## 2019-04-18 NOTE — RESPIRATORY THERAPY NOTE
Called to bedside per RN, patient desaturating into the mid to high 80's on bipap S/T 12/6 90%. FIO2 changed to 100% and settings changed to 14/8 with I-time change to 1.00. Currently saturating 90%. Will continue to monitor.

## 2019-04-19 PROBLEM — C16.9 GASTRIC CANCER (HCC): Status: ACTIVE | Noted: 2019-01-01

## 2019-04-19 NOTE — PLAN OF CARE
Pt lethargic. Opens his eyes at times. Remains comfortable on bipap with RR 18-26 bpm oxygen saturation low to mid 90's. ivf infusing. on morphine gtt at 2 mg/ hr continuous. Incontinent of urine. Turned and repositioned for comfort.  Head of the bed elevate

## 2019-04-19 NOTE — HOSPICE RN NOTE
Met with wife to talk about POC for hospice admission; we will slowly wean bipap per respiratory therapist as has been happening along with titration of MS dose for comfort. Consents signed with Hospice MSW. Vicente Maier   POC reviewed with Dr. Samy Briones, attending a

## 2019-04-19 NOTE — PLAN OF CARE
Problem: GASTROINTESTINAL - ADULT  Goal: Maintains adequate nutritional intake (undernourished)  Description  INTERVENTIONS:  - Monitor percentage of each meal consumed  - Identify factors contributing to decreased intake, treat as appropriate  - Assist function  Description  INTERVENTIONS:  - Assess patient stability and activity tolerance for standing, transferring and ambulating w/ or w/o assistive devices  - Assist with transfers and ambulation using safe patient handling equipment as needed  - Ensure

## 2019-04-19 NOTE — PLAN OF CARE
Pt RR increased into the 30's, morphine drip increased to 3mg for comfort. Respiratory called for additional input, will continue to monitor.

## 2019-04-19 NOTE — PROGRESS NOTES
04/19/19 9002   Clinical Encounter Type   Visited With Patient and family together  (wife and son)   Routine Visit Follow-up   Continue Visiting   (Patient's wife stated that she would ask for a  if they desired spiritual care.)   Patient's Supp

## 2019-04-19 NOTE — PROGRESS NOTES
[facility]    Nephrology Progress Note    Reena Castillo Attending:  Sunitha Lopez MD       SUBJECTIVE:     Lethargic in bed   On Bipap today   Plan for hospice     PHYSICAL EXAM:     Vital Signs: BP 98/52 (BP Location: Right arm)   Pulse 110 0.00 04/03/2019    BASABS 0.00 04/03/2019    NEUT 89 04/03/2019    LYMPH 3 04/03/2019    MON 8 04/03/2019    EOS 0 04/03/2019    BASO 0 04/03/2019    NEPERCENT 83.3 04/11/2019    LYPERCENT 10.1 04/11/2019    MOPERCENT 5.4 04/11/2019    EOPERCENT 0.0 04/11/ 650 mg Oral Q6H PRN       ASSESSMENT/PLAN:     49 yo M with history of metastatic gastric cancer with pulm/hepatic/retroperitoneal mets, prior PE, anxiety, anemia, FOUZIA recent hospitalization with fevers (possibly related to tumor, ?pna) presented with low

## 2019-04-19 NOTE — CM/SW NOTE
sw met with pts wife to discuss hospice services and she signed consents for GIP. Nurse to meet with wife for admission and to discuss weaning off of bipap.

## 2019-04-19 NOTE — PROGRESS NOTES
04/19/19 0505   BiPAP   $ RT Standby Charge (per 15 min) 1   Device V60   BiPAP / CPAP CE# 6080   Mode Spontaneous   Interface Full face mask   Mask Size Medium   Control Settings   Set Rate 12 breaths/min   Set IPAP 14   Set EPAP 8   Oxygen Percent 60

## 2019-04-19 NOTE — DISCHARGE SUMMARY
General Medicine Discharge Summary     Patient ID:  Mychal   48year old  [id number]  10/10/1965    Admit date: 4/10/2019    Discharge date and time: 4/19/2019  3:13 PM discharge to hospice    Attending Physician: Anish Hess MD    Primary Care P

## 2019-04-19 NOTE — PAYOR COMM NOTE
--------------  CONTINUED STAY REVIEW    Payor: [employer] PPO  Subscriber #:  [id number]  Authorization Number: [id number]    Admit date: 4/10/19  Admit time: 2103    Admitting Physician: Guy Palomino MD  Attending Physician:  Shahana Simmons MD  Primary Care

## 2019-04-19 NOTE — PROGRESS NOTES
[facility] Hospitalist Progress Note                                                                   [address] [facility]  10/10/1965    CC: FU    Interval History:  - on bipap- not responsive  -f was not able to undergo chemotherapy due to poor PS admitted with lethargy     #acute toxic metabolic encephalopathy  -suspect secondary to metabolic derangements but not improved with correction  -recurrent fever for some time- seen by ID last admit and o

## 2019-04-19 NOTE — PROGRESS NOTES
[facility]  Progress Note    Pam Montes Patient Status:  Inpatient    10/10/1965 MRN [id number]   [facility] 4NW-A Attending Yared Carmona MD   Hosp Day # 9 PCP Mary Oates MD     Subjective:  Mr. Severino Alexander is resting comfortably Janet. We plan for Palliative care consultation today as to [address] 66 discussion and options. This is advanced stage cancer of the stomach and he has declined significantly as a result of progressive cancer.  There is no plan for active chemotherapy given hi

## 2019-04-19 NOTE — PROGRESS NOTES
04/18/19 1954   BiPAP   $ RT Standby Charge (per 15 min) 1  (bipap)   Device V60   BiPAP / CPAP CE# 6080   Mode Spontaneous/Timed   Interface Full face mask   Mask Size Medium   Control Settings   Set Rate 12 breaths/min   Set IPAP 14   Set EPAP 8   Oxy

## 2019-04-20 NOTE — PROGRESS NOTES
04/20/19 0504   BiPAP   $ RT Standby Charge (per 15 min) 1   Device V60   BiPAP / CPAP CE# 6080   Mode Spontaneous/Timed   Interface Full face mask   Mask Size Medium   Control Settings   Set Rate 12 breaths/min   Set IPAP 17   Set EPAP 8   Oxygen Perce

## 2019-04-20 NOTE — RESPIRATORY THERAPY NOTE
Patient received on bipap 17/8 back up rate 12 40%.   Patient is suppose to transition to hospice today and will wean bipap when son gets here

## 2019-04-20 NOTE — H&P
[facility] Hospitalist H&P       CC:  Admission to hospice    PCP: Doris Valente MD    History of Present Illness:   47 yo with widely met gastric CA was not able to undergo chemotherapy due to poor PS admitted with lethargy, acute TME, respiratory failure, AK wheezing, ok effort   Chest wall:  No tenderness or deformity.    Heart:  Regular rate and rhythm, S1, S2 gia    Abdomen:   Soft,   no overt hernias   Extremities: Extremities atraumatic   Skin: jaundiced   Neurologic:  Psychiatric: No focal deficits  Asl

## 2019-04-20 NOTE — PLAN OF CARE
Patient responsive to painful stimuli. Opens eyes but does not follow command or track. On morphine PCA 3mg/hr. Jaundice throughout body. Occassional moaning heard. PRN ativan given x1 for restlessness. IVF as ordered.  Bipap and weaning per respiratory, to

## 2019-04-20 NOTE — HOSPICE RN NOTE
GIP day 2- Patient assessed by Mary García. Wife at bedside. Patient appears comfortable with morphine drip at 3mg/hr. Scope patch in place to minimize secretions. Patient did not open eyes at all during my visit.  Spoke to wife who is agreeable to switch IVF (c

## 2019-04-21 NOTE — PLAN OF CARE
[facility],  contacted. patient is is a potential eye donor. Spoke to Eduin Grove from eye bank ph # [phone]. Eye prep done as instructed. Post mortem care rendered. All departments notified.

## 2019-04-21 NOTE — PLAN OF CARE
Labored breathing noted,sats at 82% on 7L O2,morphine drip increased to 4mg/hr. Spouse & son at bedside. With generalized jaundice & R arm swelling. Turned & repositioned per wife's request.Comfort measures promoted.

## 2019-04-21 NOTE — PLAN OF CARE
Patient responds to painful stimuli. No s/s of pain. Opens eyes sporadically but doesn't follow command or track. Jaundice throughout body. Mild mottling to hands observed. IVF per [facility] as ordered. Morphine PCA infusing at 3mg/hr.  Gonzalez intact draining dark

## 2019-04-21 NOTE — DISCHARGE SUMMARY
General Medicine Death/Discharge Summary     Patient ID:  Stephanie Gee  48year old  10/10/1965    Admit date: 4/19/2019    Discharge date and time: 4/21/2019  4:12 PM     Attending Physician: Mary Monique MD    Primary Care Physician: Julio Cesar Guthrie MD

## 2019-04-21 NOTE — HOSPICE RN NOTE
GIP day 3- Patient assessed in bed, no family present. Appears very near EOL. BP 59/31, , RR 18, shallow 02 Sat 76%. Morphine drip now at 4mg/hr. Patient appears comfortable with current interventions.  Bipap now off and patient is on o2 via nasal ca

## 2019-04-21 NOTE — PROGRESS NOTES
[facility] Hospitalist Progress Note     Angelica Aranda Patient Status:  Inpatient    10/10/1965 MRN [id number]   [facility] 4NW-A Attending Lizet Loera MD   Hosp Day # 2 PCP Donato Patton MD     CC: follow up    SUBJECTIVE:  SERGE overnight morphINE 4 mg/hr (04/21/19 0914)     PRN Medication:acetaminophen, LORazepam **OR** LORazepam **OR** LORazepam, haloperidol lactate **OR** haloperidol lactate, morphINE sulfate, Atropine Sulfate, glycopyrrolate, bisacodyl, ondansetron **OR** ondansetron HC

## 2019-04-21 NOTE — PROGRESS NOTES
04/21/19 1359   Clinical Encounter Type   Visited With Family   Crisis Visit Death   Referral From Family   Referral To    [facility] Encounters   [facility] Needs Prayer   Patient Spiritual Encounters   Spiritual Interventions  initiated

## 2019-04-21 NOTE — PLAN OF CARE
Unable to appreciate vital signs,all vital signs ceased. verified by second nurse Mariluz Mcgregor. Spouse at bedside. Paged Dr Ottoniel Newman to inform. in house  requested presence in the room. will notify hospice care.     5 Dr Ottoniel Newman informed,patient's death pron

## 2019-04-22 NOTE — PAYOR COMM NOTE
--------------  DISCHARGE REVIEW    Payor: [facility]  Subscriber #:  [id number]  Authorization Number: [id number]    Admit date: 4/10/19  Admit time:  2103  Discharge Date: 4/19/2019  3:13 PM     Admitting Physician: Yves Summers MD  Primary Care Physic

## 2020-01-10 NOTE — PROGRESS NOTES
[facility]  Progress Note    Renee Phillips Patient Status:  Inpatient    10/10/1965 MRN [id number]   [facility] 4NW-A Attending Stuart Martinez MD   Hosp Day # 2 PCP Lionel Ritter MD     Subjective:    He is feeling same  Weak and Discharge papers given to pt.  Verbalizes understanding of discharge dx, follow up with orthopedics per referral.  IV out and site is WDL.  Rates pain 1/10. Sling on to left arm per MD order.  Discharged to home A&O with uncle here to transport.    4.2  4. 1   --    CL  99  103   --    CO2  25.0  29.0   --    ALKPHO  354*   --    --    AST  80*   --    --    ALT  36   --    --    BILT  3.1*   --    --    TP  6.6   --    --        Imaging:          Medications reviewed.     • predniSONE  5 mg Oral BID

## 2021-06-12 NOTE — PLAN OF CARE
Problem: Impaired Functional Mobility  Goal: Achieve highest/safest level of mobility/gait  Interventions:  - Assess patient's functional ability and stability  - Promote increasing activity/tolerance for mobility and gait  - Educate and engage patient/fam normal (ped)...

## 2023-07-21 NOTE — PROGRESS NOTES
[facility] FOLLOW UP NOTE  HEMATOLOGY AND ONCOLOGY    ASSESSMENT AND PLAN   In summary, 71 year old female who presents today for follow-up visit:    IMPRESSION/PLAN:  1. Metastatic, recurrent clear cell renal cell carcinoma. 3/2023  2. Osseous metastases  3. Elevated total bili  4. Abnormal MRI with  Indeterminate heterogeneously enhancing 1.8 cm left frontal calvarial lesion. 3/2023  5. Adrenal adenoma 3 cm  6. History pT3a, pN0 multifocal clear cell renal cell carcinoma status post left nephrectomy. 2019  7. CLL? S/p chemotherapy 1990s?  8. Former smoker    9. Thrombocytopenia   10. Right breast cellulitis    Discussed clinical presentation, labs, imaging and overall management with patient and family.   Patient was recently diagnosed with right breast cellulitis and treated with antibiotics.  During that time patient was noted to have encephalopathy which has resolved.  Questionable encephalopathy is multifactorial potentially related to lenvatinib versus infection.  We discussed recent CT findings showing overall treatment response.  We discussed multiple management options with patient including alternative systemic therapies such as axitinib/immunotherapy.  Risks and benefits of each were discussed.  In a shared decision-making process with the patient we will plan to proceed with further dose reduction of lenvatinib to 4 mg daily with Keytruda.  We discussed optimal dosing of lenvatinib and potential efficacy versus toxicity.  Patient expressed verbal understanding and states she would like to proceed with 4 mg.  In view of recent infection and overall tolerability issues.  Per pt preference plan to proceed with Keytruda today and if patient is clinically doing well resume 4 mg of lenvatinib in 1 week.     1. Proceed with Keytruda.  Plan to resume dose reduced lenvatinib 4 mg per patient's preference in 1 week   2. Follow-up CBC and CMP in 1 week  3. Patient to contact clinic with any concerning  [facility] Hospitalist Progress Note                                                                   [address] [facility]  10/10/1965    CC: FU    Interval History:  - About the same today    Current M chemotherapy due to poor PS admitted with lethargy     # AMS, lethargy  - Suspect 2/2 severe metabolic derangement   - See below  - Possible infection vs tumor fevers- empiric abx while pending cx results  - Neuro exam limited currently due to lethargy- wi issues prior to next visit  4. On Zometa   5. Reviewed labs   6. Discussed healthy diet and lifestyle.  7. Discussed with RN staff  8. Patient to follow up in 1 week or sooner if needed    Discussed with patient the natural history, course and prognosis of illness.    Therapeutic options discussed and explained.  Side effects, risks and benefits, and alternatives discussed.  Patient acknowledges understanding of disease and agrees with treatment plan.    All questions were answered satisfactorily. Patient is instructed to contact us should issue arise prior to her next scheduled appointment. I spent time on this case reviewing documentation, interpreting results, updating H&P, face to face time and coordinating care.    Thank you for letting me participate in the care of this patient.    Yomi Alcantar, DO [facility] [place]   [facility]   HEMATOLOGY/ONCOLOGY SUMMARY    10/2019  Pathologic Diagnosis :     A: Left hilar lymph nodes, dissection:     - Five lymph nodes negative for metastatic carcinoma (0/5)         B: Left kidney, left adrenal gland and left adrenal mass, excision:     - Multifocal clear cell renal cell carcinoma, Fidencio nuclear grade 3     - Tumor invades perinephric adipose tissue and is present within renal vein     - Largest tumor focus = 10.5 cm     - Surgical margins negative for tumor      3/2023  1. Interval development of large infiltrative mass in the left renal  fossa/retroperitoneum with invasion into the left lateral L2-L4 vertebra,  better evaluated on recent MRI. Laterally this mass extends to the level of  the descending colon where possible colonic invasion is not excluded. The  mass measures up to 12.0 x 5.0 cm and is most compatible with metastatic  disease.  2. 12 mm low-attenuation/exophytic lesion in the upper pole the right  kidney, increased in size when compared to prior exam. Recommend  nonemergent ultrasound for further  evaluation.  3. Stable 10 mm rounded focus in the L5 vertebral body. Metastatic lesion  is not excluded.    MRI Lumbar 3/2023   IMPRESSION:   1.  Infiltrating mass lateral to the left L2, L3, and L4 vertebra.   Extension of mass into the left L2-L3 and left L3-L4 neural foramina with  mild mass effect on the left lateral thecal sac.  Mass encroachment of the  L2-L3 and L3-L4 neural foramina.  Mass is most likely recurrence from left  renal cell cancer.  2.  Metastatic enhancement of the posterior L3 and posterior L4 vertebral  cortex with involvement of the left L3 pedicle and superior left L4  pedicle.  3.  Mild multilevel degenerative disc disease.  4.  Stable left 3 cm adrenal adenoma.    3/2023  Lower back soft tissue FNA:   -Metastatic renal cell carcinoma, clear-cell type    Palliative RT 3/2023     CANCER STAGING  Oncology History   Renal cell carcinoma, unspecified laterality (CMD)   3/9/2023 Initial Diagnosis    Renal cell carcinoma, unspecified laterality (CMD)     3/24/2023 -  Supportive Treatment    Treatment Summary   Treatment goal Adjuvant   Plan Name ZOLEDRONIC ACID (ZOMETA) EVERY 28 DAYS x 3 then every 3 months: BONE METASTASES/MULTIPLE MYELOMA   Status Active   Start Date 3/24/2023   End Date 7/1/2025 (Planned)   Provider Yomi Alcantar DO   Chemotherapy [No matching medication found in this treatment plan]          3/24/2023 -  Chemotherapy    Lenvatinib, 20 MG Daily Dose, (LENVIMA) 2 x 10 MG Capsule Therapy Pack, 20 mg, Oral, DAILY, 4 of 10 cycles  Dose modification: 8 mg (original dose 20 mg, Cycle 2, Reason: Toxicity), 10 mg (original dose 20 mg, Cycle 3, Reason: Toxicity), 8 mg (original dose 20 mg, Cycle 3, Reason: Toxicity)  Lenvatinib (10 MG Daily Dose) (LENVIMA) Capsule Therapy Pack 20 mg, 20 mg, Oral, DAILY, 6 of 12 cycles  Lenvatinib, 8 MG Daily Dose, 2 x 4 MG Capsule Therapy Pack, 8 mg, Oral, DAILY, 2 of 2 cycles  Dose modification: 4 mg (original dose 20 mg, Cycle 6, Reason:  Physician Order)         CHIEF COMPLAINT  Chief Complaint   Patient presents with   • Office Visit     Renal cell carcinoma, unspecified laterality        INTERVAL HISTORY  Melissa Hope presents today for follow-up visit.  Patient states she is doing much better overall.  Patient and family noted confusion patient was diagnosed with cellulitis of right breast.  She is currently off antibiotics and reports significant improvement of cellulitis.  Home care is following patient does note improvement.  She is no complaints today Patient denies chest pain, shortness of breath, fevers, chills, nausea, vomiting, abdominal pain, blood in urine or stool, focal neurological deficits, B symptoms, masses/adenopathy.    REVIEW OF SYSTEMS:  All other systems are reviewed and are negative except as documented in the interval history as above.      CURRENT MEDICATIONS  Current Outpatient Medications   Medication Sig   • lisinopril (ZESTRIL) 10 MG tablet Take 1 tablet by mouth daily.   • mupirocin (BACTROBAN) 2 % ointment Apply topically 2 times daily.   • nystatin (MYCOSTATIN) 158517 UNIT/GM powder Apply topically every 12 hours.   • Lenvatinib, 8 MG Daily Dose, 2 x 4 MG Capsule Therapy Pack Take 8 mg by mouth daily. Do not start before July 8, 2023.   • diphenhydrAMINE-APAP, sleep, (TYLENOL PM EXTRA STRENGTH PO) Take 2 tablets by mouth as needed (sleep).   • Insulin Syringe-Needle U-100 (ReliOn Insulin Syringe) 31G X 15/64\" 0.5 ML Misc    • Insulin Syringe-Needle U-100 (ReliOn Insulin Syringe) 31G X 5/16\" 0.5 ML Misc USE 1 SYRINGE TWICE DAILY AS DIRECTED   • sertraline (ZOLOFT) 100 MG tablet Take 1 tablet by mouth daily.   • metFORMIN (GLUCOPHAGE) 500 MG tablet Take 2 tablets by mouth in the morning and 2 tablets in the evening.   • pravastatin (PRAVACHOL) 10 MG tablet Take 1 tablet by mouth daily.   • blood glucose (ONE TOUCH ULTRA TEST) test strip TEST TWICE A DAY   • OneTouch Delica Lancets 30G Misc Test twice daily   •  Emollient (Udderly Smooth) Cream Apply to affected area(s) generously 2 to 4 times daily, or as needed. For external use only. (Patient taking differently: Apply to affected area(s) generously 2 to 4 times daily as needed on feet. For external use only.)   • loperamide (Anti-Diarrheal) 2 MG tablet Take 4 mg (2 tablets) by mouth at the first sign of diarrhea, followed by 2 mg (1 tablet) after each loose stool   • prochlorperazine (COMPAZINE) 10 MG tablet Take 1 tablet by mouth every 6 hours as needed for Nausea or Vomiting. (Patient not taking: Reported on 2023)   • ondansetron (ZOFRAN) 8 MG tablet Take 1 tablet by mouth every 8 hours as needed for Nausea. Take prior to each oral chemotherapy dose for one week and then only as needed.     Current Facility-Administered Medications   Medication   • sodium chloride (NORMAL SALINE) 0.9 % bolus 1,000 mL   • sodium chloride (NORMAL SALINE) 0.9 % bolus 1,000 mL   • EPINEPHrine (ADRENALIN) injection 0.3 mg   • diphenhydrAMINE (BENADRYL) injection 50 mg   • famotidine (PEPCID) injection 20 mg   • methylPREDNISolone (SOLU-Medrol) injection 125 mg   • albuterol inhaler 2 puff   • albuterol (VENTOLIN) nebulizer 5 mg       PAST MEDICAL HISTORY  Past Medical History:   Diagnosis Date   • Allergy    • Anemia    • Blood transfusion without reported diagnosis    • Depressive disorder    • Diabetes mellitus (CMD)    • Endometriosis    • Essential (primary) hypertension    • Personal history of CLL (chronic lymphocytic leukemia) 1995   • Psoriasis     on head   • Renal mass, left        SOCIAL HISTORY  Social History     Tobacco Use   • Smoking status: Former     Current packs/day: 0.00     Types: Cigarettes     Quit date: 1985     Years since quittin.5   • Smokeless tobacco: Never   • Tobacco comments:     10-21-19   Vaping Use   • Vaping Use: never used   Substance Use Topics   • Alcohol use: Not Currently   • Drug use: No     Comment: 10-21-19       FAMILY  HISTORY  Family History   Problem Relation Age of Onset   • Heart Mother    • Other Mother         Osteoporosis   • Heart disease Sister    • Psychiatric Sister    • Stroke Brother    • Hypertension Brother    • Thyroid Daughter    • Other Son         gout       OBJECTIVES   PHYSICAL EXAMINATION  Oncology Encounter Vitals [07/21/23 1129]   ONC OP Encounter Vitals Group      /68      Heart Rate 67      Resp 16      Temp 98.7 °F (37.1 °C)      Temp src Temporal      SpO2       Weight 236 lb 11.2 oz (107.4 kg)      Height 5' 4\" (1.626 m)      Pain Score  0      Pain Location       Pain Education?       BSA (Calculated - m2) - [employer]helle [employer] Richelle 2.1      BSA (Calculated - sq m) 2.2      BMI (Calculated) 40.63       ECOG Performance Status   ECOG   ECOG Performance Status     1-2        GEN:  NAD  Eyes::  Normal eye movements. No scleral icterus or conjunctival pallor.    ENT:  Oropharynx reveals no exudate, thrush or ulcers or hemorrhages.    Neck:  Trachea midline.    Respiratory:  Normal chest expansion noted.  Auscultation reveals normal breath sounds bilaterally.   Cardiovascular:  Normal rhythm and rate.    Abdomen:  Soft, nontender.   Musculoskeletal:   No muscle or joint tenderness.    Neurologic:  No Acute focal neurological deficits.  Moving all extremities spontaneously.   Skin:  No rashes, lesions, ulcers petechiae or hemorrhages on visualized areas.  Bilateral upper and lower extremities:  No clubbing, cyanosis or edema.  Lymphatics: No supraclavicular, cervical, axillary lymphadenopathy       LABORATORY DATA  No results found for this visit on 07/21/23.  Lab Results   Component Value Date    WBC 4.6 07/21/2023    HGB 13.8 07/21/2023    PLT 97 (L) 07/21/2023    MCV 93.9 07/21/2023    Lab Results   Component Value Date    SODIUM 144 07/21/2023    POTASSIUM 3.9 07/21/2023    CHLORIDE 110 07/21/2023    CO2 29 07/21/2023    BUN 12 07/21/2023    CREATININE 0.99 (H) 07/21/2023    GLUCOSE 148 (H) 07/21/2023       Lab Results   Component Value Date     03/23/2023    Lab Results   Component Value Date    AST 42 (H) 07/21/2023    GPT 13 07/21/2023    ALKPT 89 07/21/2023    BILIRUBIN 1.1 (H) 07/21/2023    TOTPROTEIN 5.3 (L) 07/21/2023    ALBUMIN 2.5 (L) 07/21/2023
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
## 2024-08-28 NOTE — CM/SW NOTE
SW met with pt and wife . sw providing support. Wife talking about discussion she had with son. Wife coping appropriately at this time. Detail Level: Detailed

## 2025-04-28 NOTE — CM/SW NOTE
03/19/19 1500   CM/SW Referral Data   Referral Source Social Work (self-referral)   Reason for Referral Discharge planning   Informant Patient   Patient Info   Patient's Mental Status Alert;Oriented   Patient's Home Environment House   Patient lives wit
04/02/19 1600   Discharge disposition   Expected discharge disposition Skilled Nurs   Name of [address] [facility]   Discharge transportation (superior)   RN, [facility] and wife aware of dc time.
Call from Maria Isabel with the [address]. They have auth from [facility] for pt to admit. I contacted RN Myla Mckeon on unit and per RN pt will not be ready for discharge today and Maria Isabel is informed.  She will f/u with weekend dc planners tomorrow for discharg
ISABELLA spoke with Annalise Hussein (wife) via phone to discuss discharge plan. She would like a referral sent to The [address] [place] [address] and The [facility].      ISABELLA asked wife is she would like a Palliative care consult to discuss goals of care and she said that sounds beneficial. 
Informed by RN that pt can d/c today to The [facility]. Spoke with Derryl Dandy at above [facility] 12 [phone]. She requests update and will call  back. Update sent via [address]. Awaiting return call re: ins auth.     ADDENDUM:  Return call from Derryl Dandy with [facility]
Interdisciplinary Rounds: 03/26/19  Admitted: 3/18/2019 LOS: 8  Disciplines in attendance: charge nurse, staff nurse, CM, [address] and discharge plan reviewed. Discharge to [facility] when appropriate.     Active issues needing resolution prior to
MICHAEL paged [facility] w/PC order.
Rodolfo Nava confirmed they had insurance auth prior to the weekend. She stated the still currently have insurance. She stated she saw a note stating a posey vest was used PRN. Informed her the posey vest was used once on 3/26.  Sen documentation from psych and a
SW called The [facility] multiple times to see if they are able to accept the pt. They are stating that because the MD documented the pt is in a posey, they may not be able to accept. SW asked all of admissions questions. Sent RN note stating the pt is calm.
SW met w/pt's wife. Wife confirmed she would like the pt to dc to [facility]. Not sure when the pt will dc. [facility] to provide [facility].
SW spoke w/DON screener. DON has been completed.
[facility] is out of network. Checking to see if [facility] is in network will follow up.
The [facility] are able to clinically accept. Asked them to start working on insurance auth.
[Follow-Up] : a follow-up evaluation of

## (undated) NOTE — IP AVS SNAPSHOT
Patient Demographics     Address  [address] [place] [address]  Lynette Wei [address] Phone  [phone] [facility]) *Preferred*  [phone] [facility]) E-mail Address  [email or web address]      Emergency Contact(s)     Name Relation Home Work Mobile    Naomi Goodwin Take 500 mg by mouth every 6 (six) hours as needed for Pain. clonazePAM 1 MG Tabs  Commonly known as:  KLONOPIN  Next dose due:  4/2 bedtime 1mg       Take 1 tablet (1 mg total) by mouth nightly.    Susanne Heimlich, MD         clonazePAM 0.5 MG predniSONE 5 MG Tabs     Please  your prescriptions at the location directed by your doctor or nurse    Bring a paper prescription for each of these medications  clonazePAM 0.5 MG Tabs  clonazePAM 1 MG Tabs  morphINE Sulfate 10 MG/5ML [facility] [employer]    Component Value Reference Range Flag Lab   Glucose 124 70 - 99 mg/dL [facility]   Sodium 133 136 - 145 mmol/L  [facility]   Potassium 4.0 3.5 - 5.1 mmol/L — [facility]   Chloride 100 98 - 107 mmol/L — [facility]   CO2 27.0 21.0 - 32.0 mmol Blood Culture FREQ X 2 [[id number]] Collected:  03/30/19 6579    Order Status:  Completed Lab Status:  Preliminary result Updated:  04/02/19 0700    Specimen:  Blood,peripheral      Blood Culture Result No Growth 3 Days    Blood Culture Once [[id number]] Co Fatigue (constitutional, neurologic)  Altered Mental Status (neurologic)  Pneumonia       PCP: Agustin Mendez MD    History of Present Illness:[CY.3      48year old male with PMH sig for metastatic gastric caner, anxiety, and FOUZIA who presents to [facility] ED c fev Comprehensive ROS reviewed and negative except for what's stated above.  \  OBJECTIVE:  /71 (BP Location: Right arm)   Pulse 80   Temp 97.5 °F (36.4 °C) (Oral)   Resp 14   Ht 6' (1.829 m)   Wt 212 lb 15.4 oz (96.6 kg)   SpO2 95%   BMI 28.88 kg/m² the right hemidiaphragm. CARDIAC:  The heart size upper limits of normal without venous congestion. MEDIASTINUM:  Normal. PLEURA:  Minimal blunting of the CP angles. BONES:  Normal for age. CONCLUSION:  1.  There is right middle lobe and right lower lo without gallstones, contracted gallbladder with echogenic sludge.     Dictated by: Lashon Carranza MD on 3/18/2019 at 13:42     Approved by: Lashon Carranza MD            Mri Brain (w+wo) (IYE=[id number])    Result Date: 3/7/2019  PROCEDURE:  MRI BRAIN (W+ duct, gastric cancer COMPARISON: CT chest 2/11/2019 TECHNIQUE:  PET-CT imaging was performed after IV administration of 13.186 mCi of F-18 FDG, with an uptake time of 90 minutes.   Tomographic images were obtained from the orbits through the thighs, with th regions. The maximum SUV is 16.7 and this corresponds to a portacaval lymph node. Physiologic uptake is noted. SKELETON: No suspicious focal areas of abnormal FDG uptake are detected in the visualized osseous structures. .    IMPRESSION: 1.  Hypermetabolic l obstruction. Multiple diverticular changes are seen throughout the colon most notably within the descending and sigmoid colon. There is disc space narrowing at L5-S1 with spurring. There is splenomegaly measuring up to 15.5 cm. CONCLUSION:  1.  Sin PROCEDURE:  CTA CHEST + CT ABD (W) + CT PEL (W) (CPT=71275/29975)  COMPARISON:  LIANG , CT ABDOMEN+PELVIS(CONTRAST ONLY)(CPT=74177), 1/31/2019, 20:32.   INDICATIONS:  fever this am, scheduled for chemo today  TECHNIQUE:  CT of the chest (with CTA imaging), 14.9 cm in AP dimension. KIDNEYS:  No mass, obstruction, or calcification. ADRENALS:  No mass or enlargement. AORTA:  No aneurysm or dissection. RETROPERITONEUM:  Nodes again identified. To the left of the celiac axis is a 1.3 x 0.9 cm node.   Aortocav -blood cxs, cx from port r/o line infection; Ucx  -RML infiltrate on CXR may take more time to resolve, received treatment last admit    # Hypoxia  - d/w Atelectasis and hypoventialtion  - supp o2 as needed, on 3L; was set up c home O2     # acute encephal 1.   Sinus tachycardia. This is a reactive arrhythmia. 2.   Metastatic gastric cancer. 3.   Fever, sepsis, weakness, hypoxia, encephalopathy with paranoia and anxiety. PLAN:    1.    Treat reversible etiologies, fever, infection, volume status, pain intermittently agitated and then sleeping. Patient is tremulous. PHYSICAL EXAMINATION:    GENERAL:  Ill-appearing male. VITAL SIGNS:  Blood pressure 140/80, pulse 120 and regular. He is afebrile. HEENT:  Head normocephalic. Anicteric sclerae.   Pa Filed:  4/1/2019  1:39 PM Date of Service:  4/1/2019 12:00 PM Status:  Signed    :  Maximiliano Back PT (Physical Therapist)        PHYSICAL THERAPY TREATMENT NOTE - INPATIENT    Room Number: 040/981-A     Session: 5   Number of Visits to Meet Estab Precautions: Bed/chair alarm    WEIGHT BEARING RESTRICTION  Weight Bearing Restriction: None                PAIN ASSESSMENT   Ratin  Location: abdomen  Management Techniques:  Body mechanics;Breathing techniques;Repositioning    BALANCE year and that he was at the [employer] (pt just cleaned up and assisted with shaving by nursing) When given a choice if was at bank, he said no and then asked if at hospital he said yes. Sit to stand with RW and mod assist of 2.   Posterior lean in standin UPGRADED TO SUPERVISION      Goal #3 Patient is able to ambulate 25 feet with assist device: walker - rolling at assistance level: maximum assistance  GOAL MET 3/25/19  UPGRADED TO 150FT WITH SUPERVISION      Goal #4 Perform standardized assessment within • Anxiety state, unspecified    • Cancer [facility])    • Diverticulosis    • Diverticulosis of large intestine    • Hepatitis     hepatitis B   • Infectious mononucleosis hepatitis 2010   • Liver cancer ([facility] [place] [address]     not primary    • Mononucleosis 2010   • Muscle w AROM/AAROM with mod (A) and cueing for early termination. Total (A) to EOB. CGA sitting balance EOB. Sit to stand total (A) via RW with posterior lean, unable to correct with cueing. Chair transfer total (A) via RW.  Discussed change in functional status an SH.1 Doc Link, OT on 4/1/2019 12:44 PM                        Video Swallow Study Note - Video Swallow Study Notes      Video Swallow Study Note signed by PALAK Fu at 3/26/2019  3:27 PM  Version 1 of 1    Author:  PALAK Fu Service:  Re glasses at all times       Current Diet Consistency: Regular; Thin liquids(prior to this eval)  Prior Level of Function: Assistance/Support for ADL's  Prior Living Situation: Home with spouse  History of Recent: Pneumonia    Reason for Referral: Other (Com Strategy(ies) Implemented (Hard Solid):  Slow rate;Small bites and sips(cueing to swallow)  Effectiveness: Yes  Penetration Aspiration Scale Score: Score 1: Material does not enter airway       Overall Impression: Pt presents with mild oral phase dysphagia SLP Note signed by PALAK Méndez at 3/31/2019  1:28 PM  Version 1 of 1    Author:  PALAK Méndez Service:  Rehab Author Type:  SPEECH-LANGUAGE PATHOLOGIST    Filed:  3/31/2019  1:28 PM Date of Service:  3/31/2019 12:54 PM Status:  Sign Goal #1 The patient will tolerate mechanical chopped consistency and thin liquids without overt signs or symptoms of aspiration with 100 % accuracy over 1-2 session(s).  Met   Goal #2 The patient/family/caregiver will demonstrate understanding and implement

## (undated) NOTE — IP AVS SNAPSHOT
[address]            (For Outpatient Use Only) Initial Admit Date: 3/18/2019   Inpt/Obs Admit Date: Inpt: 3/18/19 / Obs: N/A   Discharge Date:    Victorina Wellington:  [de-identified]   MRN: [de-identified]   CSN: [id number]        ENCOUNTER  Patient Hospital Account Financial Class: [facility]    April 2, 2019

## (undated) NOTE — ED AVS SNAPSHOT
[facility]   MRN: [id number]    Department:  [facility] Emergency Department   Date of Visit:  1/31/2019           Disclosure     Insurance plans vary and the physician(s) referred by the ER may not be covered by your plan.  Please contact your tell this physician (or your personal doctor if your instructions are to return to your personal doctor) about any new or lasting problems. The primary care or specialist physician will see patients referred from the [facility] Emergency Department.  Gina Cornelius